# Patient Record
Sex: FEMALE | Race: WHITE | NOT HISPANIC OR LATINO | Employment: PART TIME | URBAN - METROPOLITAN AREA
[De-identification: names, ages, dates, MRNs, and addresses within clinical notes are randomized per-mention and may not be internally consistent; named-entity substitution may affect disease eponyms.]

---

## 2017-02-27 ENCOUNTER — OFFICE VISIT (OUTPATIENT)
Dept: FAMILY MEDICINE CLINIC | Facility: CLINIC | Age: 47
End: 2017-02-27

## 2017-02-27 VITALS
BODY MASS INDEX: 23.04 KG/M2 | WEIGHT: 130 LBS | HEIGHT: 63 IN | HEART RATE: 100 BPM | TEMPERATURE: 98.5 F | SYSTOLIC BLOOD PRESSURE: 102 MMHG | OXYGEN SATURATION: 99 % | DIASTOLIC BLOOD PRESSURE: 60 MMHG

## 2017-02-27 DIAGNOSIS — J06.9 VIRAL UPPER RESPIRATORY INFECTION: ICD-10-CM

## 2017-02-27 DIAGNOSIS — R68.89 FLU-LIKE SYMPTOMS: Primary | ICD-10-CM

## 2017-02-27 PROCEDURE — 99213 OFFICE O/P EST LOW 20 MIN: CPT | Performed by: FAMILY MEDICINE

## 2017-02-27 NOTE — PATIENT INSTRUCTIONS
Rest and increase fluids.  Treat symptoms OTC appropriately.  If symptoms worsen, call the office and ABX will be initiated.

## 2017-02-27 NOTE — PROGRESS NOTES
Subjective   Jill M Schoenbachler is a 46 y.o. female with   Chief Complaint   Patient presents with   • Chills     since Friday   • Generalized Body Aches   • Nasal Congestion   • Earache   • Sore Throat   .    History of Present Illness     Mary Jane, a 46 yr old white female, who presents today with new onset of chills, congestion, drainage, ear pain, sore throat and body aches for the last 3 days.  She thinks that she did have a fever although not documented.  Mary Jane thinks that she has a virus.  Her youngest has been ill recently.  She has continued to work on a daily basis.    The following portions of the patient's history were reviewed and updated as appropriate: allergies, current medications, past family history, past medical history, past social history, past surgical history and problem list.    Review of Systems   Constitutional: Positive for chills and fatigue.   HENT: Positive for congestion, ear pain, postnasal drip, sinus pressure, sore throat and voice change.    Respiratory: Positive for cough.    Neurological: Positive for weakness.   All other systems reviewed and are negative.      Objective     Vitals:    02/27/17 1642   BP: 102/60   Pulse: 100   Temp: 98.5 °F (36.9 °C)   SpO2: 99%     BP Readings from Last 3 Encounters:   02/27/17 102/60   08/24/16 126/82   03/29/16 116/64      Wt Readings from Last 3 Encounters:   02/27/17 130 lb (59 kg)   03/29/16 126 lb (57.2 kg)   01/06/16 124 lb 0.1 oz (56.3 kg)        Physical Exam   Constitutional: She is oriented to person, place, and time. She appears well-developed and well-nourished.   HENT:   Head: Normocephalic and atraumatic.   Right Ear: Hearing, tympanic membrane, external ear and ear canal normal.   Left Ear: Hearing, tympanic membrane, external ear and ear canal normal.   Nose: Nose normal.   Mouth/Throat: Uvula is midline, oropharynx is clear and moist and mucous membranes are normal.   Pulmonary/Chest: Effort normal and breath sounds normal.    Neurological: She is alert and oriented to person, place, and time.   Skin: Skin is warm.   Psychiatric: She has a normal mood and affect. Her behavior is normal. Judgment and thought content normal.   Nursing note and vitals reviewed.      Assessment/Plan   Mary Jane was seen today for chills, generalized body aches, nasal congestion, earache and sore throat.    Diagnoses and all orders for this visit:    Flu-like symptoms    Viral upper respiratory infection      Patient Instructions   Rest and increase fluids.  Treat symptoms OTC appropriately.  If symptoms worsen, call the office and ABX will be initiated.    Return if symptoms worsen or fail to improve.      Scribed for Sukhdev Coronado MD by Leon Collins. 02/27/2017    ISukhdev MD personally performed the services described in this documentation, as scribed by Leon Collins in my presence, and it is both accurate and complete

## 2017-03-22 ENCOUNTER — OFFICE VISIT (OUTPATIENT)
Dept: OBSTETRICS AND GYNECOLOGY | Age: 47
End: 2017-03-22

## 2017-03-22 VITALS
HEIGHT: 63 IN | BODY MASS INDEX: 22.15 KG/M2 | SYSTOLIC BLOOD PRESSURE: 110 MMHG | DIASTOLIC BLOOD PRESSURE: 64 MMHG | WEIGHT: 125 LBS

## 2017-03-22 DIAGNOSIS — N92.6 IRREGULAR MENSES: ICD-10-CM

## 2017-03-22 DIAGNOSIS — Z01.419 ENCOUNTER FOR GYNECOLOGICAL EXAMINATION: Primary | ICD-10-CM

## 2017-03-22 DIAGNOSIS — N95.1 MENOPAUSAL SYMPTOMS: ICD-10-CM

## 2017-03-22 LAB
25(OH)D3+25(OH)D2 SERPL-MCNC: 21.7 NG/ML (ref 30–100)
ALBUMIN SERPL-MCNC: 4.5 G/DL (ref 3.5–5.2)
ALBUMIN/GLOB SERPL: 1.7 G/DL
ALP SERPL-CCNC: 65 U/L (ref 39–117)
ALT SERPL-CCNC: 16 U/L (ref 1–33)
AST SERPL-CCNC: 16 U/L (ref 1–32)
BASOPHILS # BLD AUTO: 0.03 10*3/MM3 (ref 0–0.2)
BASOPHILS NFR BLD AUTO: 0.8 % (ref 0–1.5)
BILIRUB SERPL-MCNC: 0.3 MG/DL (ref 0.1–1.2)
BUN SERPL-MCNC: 16 MG/DL (ref 6–20)
BUN/CREAT SERPL: 26.7 (ref 7–25)
CALCIUM SERPL-MCNC: 9.7 MG/DL (ref 8.6–10.5)
CHLORIDE SERPL-SCNC: 102 MMOL/L (ref 98–107)
CHOLEST SERPL-MCNC: 230 MG/DL (ref 0–200)
CO2 SERPL-SCNC: 27.1 MMOL/L (ref 22–29)
CREAT SERPL-MCNC: 0.6 MG/DL (ref 0.57–1)
EOSINOPHIL # BLD AUTO: 0.09 10*3/MM3 (ref 0–0.7)
EOSINOPHIL NFR BLD AUTO: 2.5 % (ref 0.3–6.2)
ERYTHROCYTE [DISTWIDTH] IN BLOOD BY AUTOMATED COUNT: 14.4 % (ref 11.7–13)
GLOBULIN SER CALC-MCNC: 2.6 GM/DL
GLUCOSE SERPL-MCNC: 87 MG/DL (ref 65–99)
HCT VFR BLD AUTO: 39.8 % (ref 35.6–45.5)
HDLC SERPL-MCNC: 88 MG/DL (ref 40–60)
HGB BLD-MCNC: 13 G/DL (ref 11.9–15.5)
IMM GRANULOCYTES # BLD: 0 10*3/MM3 (ref 0–0.03)
IMM GRANULOCYTES NFR BLD: 0 % (ref 0–0.5)
LDLC SERPL CALC-MCNC: 135 MG/DL (ref 0–100)
LYMPHOCYTES # BLD AUTO: 1.05 10*3/MM3 (ref 0.9–4.8)
LYMPHOCYTES NFR BLD AUTO: 28.6 % (ref 19.6–45.3)
MCH RBC QN AUTO: 30.3 PG (ref 26.9–32)
MCHC RBC AUTO-ENTMCNC: 32.7 G/DL (ref 32.4–36.3)
MCV RBC AUTO: 92.8 FL (ref 80.5–98.2)
MONOCYTES # BLD AUTO: 0.33 10*3/MM3 (ref 0.2–1.2)
MONOCYTES NFR BLD AUTO: 9 % (ref 5–12)
NEUTROPHILS # BLD AUTO: 2.17 10*3/MM3 (ref 1.9–8.1)
NEUTROPHILS NFR BLD AUTO: 59.1 % (ref 42.7–76)
PLATELET # BLD AUTO: 296 10*3/MM3 (ref 140–500)
POTASSIUM SERPL-SCNC: 4.3 MMOL/L (ref 3.5–5.2)
PROT SERPL-MCNC: 7.1 G/DL (ref 6–8.5)
RBC # BLD AUTO: 4.29 10*6/MM3 (ref 3.9–5.2)
SODIUM SERPL-SCNC: 142 MMOL/L (ref 136–145)
TRIGL SERPL-MCNC: 36 MG/DL (ref 0–150)
TSH SERPL DL<=0.005 MIU/L-ACNC: 0.84 MIU/ML (ref 0.27–4.2)
VLDLC SERPL CALC-MCNC: 7.2 MG/DL (ref 5–40)
WBC # BLD AUTO: 3.67 10*3/MM3 (ref 4.5–10.7)

## 2017-03-22 PROCEDURE — 99396 PREV VISIT EST AGE 40-64: CPT | Performed by: OBSTETRICS & GYNECOLOGY

## 2017-03-22 NOTE — PROGRESS NOTES
"Subjective     Chief complaint annual exam      History of Present Illness      Jill M Schoenbachler is a very pleasant  46 y.o. female who presents for annual exam.  Mammo Exam , Contraception , Exercise patient is having an occasional irregular cycle and occasional hot flash.  Overall no other changes and remains relatively healthy.      Obstetric History:  OB History     No data available         Menstrual History:     Patient's last menstrual period was 2017.       Sexual History:       Past Medical History:   Diagnosis Date   • Allergic rhinitis    • Anxiety    • Anxiety, generalized    • Atypical chest pain    • GERD (gastroesophageal reflux disease)    • History of pregnancy      5, 2 MISCARRIAGES   • Hypercholesterolemia    • Hyperlipidemia    • Low back pain    • Lymphadenopathy    • Miscarriage     X2   • Panic attack    • Stress reaction      Past Surgical History:   Procedure Laterality Date   • NO PAST SURGERIES         SOCIAL Hx:      The following portions of the patient's history were reviewed and updated as appropriate: allergies, current medications, past family history, past medical history, past social history, past surgical history and problem list.    Review of Systems        Except as outlined in history of physical illness, patient denies any changes in her GYN, , GI systems.  All other systems reviewed were negative.         Objective   Physical Exam    /64  Ht 63\" (160 cm)  Wt 125 lb (56.7 kg)  LMP 2017  BMI 22.14 kg/m2    General: Patient is alert and oriented and appears overall healthy  Neck: Is supple without thyromegaly, no carotid bruits and no lymphadenopathy  Lungs: Clear bilaterally, no wheezing, rhonchi, or rales.  Respiratory rate is normal  Breast: Even, symmetrical, no lymphadenopathy, no retraction, no masses or cysts  Heart: Regular rate and rhythm are appreciated, no murmurs or rubs are heard  Abdomen: Is soft, without organomegaly, bowel " sounds are positive, there is no                                rebound or guarding and palpation does not produce any discomfort  Back: Nontender without CVA tenderness  Pelvic: External genitalia appear normal and consistent with mature female.  BUS normal                            Vagina is clean dry without discharge and appears adequately estrogenized, no               lesions or masses are present                         Cervix is noninflamed without discharge or lesions.  There is no cervical motion             tenderness.                Uterus is nonenlarged, without tenderness, and no masses or abnormalities are  present               Adnexa are non-enlarged, non tender               Rectal exam reveals adequate sphincter tone and no masses or lesions are                     appreciated on digital rectal examination.    There is no problem list on file for this patient.                Assessment/Plan   Mary Jane was seen today for gynecologic exam.    Diagnoses and all orders for this visit:    Encounter for gynecological examination  -     IGP, Apt HPV,rfx 16 / 18,45  -     CBC & Differential  -     Lipid Panel  -     TSH  -     Vitamin D 25 Hydroxy  -     Comprehensive Metabolic Panel    Menopausal symptoms  -     CBC & Differential  -     Lipid Panel  -     TSH  -     Vitamin D 25 Hydroxy  -     Comprehensive Metabolic Panel     We reviewed the etiology of irregular cycles and menopausal symptoms.  We will check some lab work and also discussed the possibility of trying to regulate her cycles but she does not want to do that at this stage.  Irregular menses  -     CBC & Differential  -     Lipid Panel  -     TSH  -     Vitamin D 25 Hydroxy  -     Comprehensive Metabolic Panel      Annual Well Woman Exam    Discussed today's findings and concerns with patient in detail.  Continue to recommend regular exercise to include cardiovascular and resistance training and breast self-exam. Wellness lab, mammography,  and pap smear, in accordance with age guidelines.  Nutritional and caloric recommendations discussed.    All of the patient's questions were addressed and answered, I have encouraged her to call for today's test results if she has not received them within 10 days.  Patient is advised to call with any change in her condition or with any other questions, otherwise return in 12 months for annual examination.

## 2017-03-22 NOTE — PROGRESS NOTES
"Subjective     Chief Complaint   Patient presents with   • Gynecologic Exam     Annual         History of Present Illness      Jill M Schoenbachler is a very pleasant  46 y.o. female who presents for annual exam.  Mammo Exam 2016, Contraception vas., Exercise none.      Obstetric History:  OB History     No data available         Menstrual History:     Patient's last menstrual period was 2017.       Sexual History:       Past Medical History:   Diagnosis Date   • Allergic rhinitis    • Anxiety    • Anxiety, generalized    • Atypical chest pain    • GERD (gastroesophageal reflux disease)    • History of pregnancy      5, 2 MISCARRIAGES   • Hypercholesterolemia    • Hyperlipidemia    • Low back pain    • Lymphadenopathy    • Miscarriage     X2   • Panic attack    • Stress reaction      Past Surgical History:   Procedure Laterality Date   • NO PAST SURGERIES         SOCIAL Hx:      The following portions of the patient's history were reviewed and updated as appropriate: allergies, current medications, past family history, past medical history, past social history, past surgical history and problem list.    Review of Systems        Except as outlined in history of physical illness, patient denies any changes in her GYN, , GI systems.  All other systems reviewed were negative.         Objective   Physical Exam    /64  Ht 63\" (160 cm)  Wt 125 lb (56.7 kg)  LMP 2017  BMI 22.14 kg/m2    General: Patient is alert and oriented and appears overall healthy  Neck: Is supple without thyromegaly, no carotid bruits and no lymphadenopathy  Lungs: Clear bilaterally, no wheezing, rhonchi, or rales.  Respiratory rate is normal  Breast: Even, symmetrical, no lymphadenopathy, no retraction, no masses or cysts  Heart: Regular rate and rhythm are appreciated, no murmurs or rubs are heard  Abdomen: Is soft, without organomegaly, bowel sounds are positive, there is no                                rebound or " guarding and palpation does not produce any discomfort  Back: Nontender without CVA tenderness  Pelvic: External genitalia appear normal and consistent with mature female.  BUS normal                            Vagina is clean dry without discharge and appears adequately estrogenized, no               lesions or masses are present                         Cervix is noninflamed without discharge or lesions.  There is no cervical motion             tenderness.                Uterus is nonenlarged, without tenderness, and no masses or abnormalities are  present               Adnexa are non-enlarged, non tender               Rectal exam reveals adequate sphincter tone and no masses or lesions are                     appreciated on digital rectal examination.    There is no problem list on file for this patient.                Assessment/Plan   Mary Jane was seen today for gynecologic exam.    Diagnoses and all orders for this visit:    Encounter for gynecological examination  -     IGP, Apt HPV,rfx 16 / 18,45        Annual Well Woman Exam    Discussed today's findings and concerns with patient in detail.  Continue to recommend regular exercise to include cardiovascular and resistance training and breast self-exam. Wellness lab, mammography, and pap smear, in accordance with age guidelines.  Nutritional and caloric recommendations discussed.    All of the patient's questions were addressed and answered, I have encouraged her to call for today's test results if she has not received them within 10 days.  Patient is advised to call with any change in her condition or with any other questions, otherwise return in 12 months for annual examination.

## 2017-03-23 ENCOUNTER — TELEPHONE (OUTPATIENT)
Dept: OBSTETRICS AND GYNECOLOGY | Age: 47
End: 2017-03-23

## 2017-03-23 NOTE — TELEPHONE ENCOUNTER
----- Message from Aravind Joaquin MD sent at 3/23/2017  9:53 AM EDT -----  Please notify patient her lab work is back.  She is not anemic   Her total cholesterol is slightly elevated but her good cholesterol is in a very good range so her ratio is okay.  She should bump up her vitamin D intake and take about 2000 units daily.  Otherwise labs are good

## 2017-03-23 NOTE — PROGRESS NOTES
Please notify patient her lab work is back.  She is not anemic   Her total cholesterol is slightly elevated but her good cholesterol is in a very good range so her ratio is okay.  She should bump up her vitamin D intake and take about 2000 units daily.  Otherwise labs are good

## 2017-03-27 ENCOUNTER — TELEPHONE (OUTPATIENT)
Dept: OBSTETRICS AND GYNECOLOGY | Age: 47
End: 2017-03-27

## 2017-03-27 LAB
CYTOLOGIST CVX/VAG CYTO: NORMAL
CYTOLOGY CVX/VAG DOC THIN PREP: NORMAL
DX ICD CODE: NORMAL
HIV 1 & 2 AB SER-IMP: NORMAL
HPV I/H RISK 4 DNA CVX QL PROBE+SIG AMP: NEGATIVE
Lab: NORMAL
OTHER STN SPEC: NORMAL
PATH REPORT.FINAL DX SPEC: NORMAL
STAT OF ADQ CVX/VAG CYTO-IMP: NORMAL

## 2017-12-26 ENCOUNTER — TELEPHONE (OUTPATIENT)
Dept: OBSTETRICS AND GYNECOLOGY | Age: 47
End: 2017-12-26

## 2017-12-26 NOTE — TELEPHONE ENCOUNTER
Dr TAYLOR pt exp hot flashes unable to tolerate and waking up 3-4x a night, does Dr TAYLOR recomm something otc or natural to begin with?

## 2018-01-11 ENCOUNTER — TELEPHONE (OUTPATIENT)
Dept: FAMILY MEDICINE CLINIC | Facility: CLINIC | Age: 48
End: 2018-01-11

## 2018-01-11 RX ORDER — OSELTAMIVIR PHOSPHATE 75 MG/1
75 CAPSULE ORAL 2 TIMES DAILY
Qty: 10 CAPSULE | Refills: 0 | Status: SHIPPED | OUTPATIENT
Start: 2018-01-11 | End: 2018-11-01

## 2018-03-28 ENCOUNTER — OFFICE VISIT (OUTPATIENT)
Dept: OBSTETRICS AND GYNECOLOGY | Age: 48
End: 2018-03-28

## 2018-03-28 VITALS
WEIGHT: 128 LBS | DIASTOLIC BLOOD PRESSURE: 68 MMHG | SYSTOLIC BLOOD PRESSURE: 110 MMHG | HEIGHT: 63 IN | BODY MASS INDEX: 22.68 KG/M2

## 2018-03-28 DIAGNOSIS — E55.9 VITAMIN D DEFICIENCY: ICD-10-CM

## 2018-03-28 DIAGNOSIS — Z01.419 ENCOUNTER FOR GYNECOLOGICAL EXAMINATION: Primary | ICD-10-CM

## 2018-03-28 PROCEDURE — 99396 PREV VISIT EST AGE 40-64: CPT | Performed by: OBSTETRICS & GYNECOLOGY

## 2018-03-28 NOTE — PROGRESS NOTES
"Subjective     Chief Complaint   Patient presents with   • Gynecologic Exam     Annual:last mammo 2016         History of Present Illness      Jill M Schoenbachler is a very pleasant  47 y.o. female who presents for annual exam.  Mammo Exam Rescheduled, Contraception vasectomy, Exercise none officially but patient gets 8-10,000 steps daily. She needs to add resistance to her exercise regimen.  She has no new gynecological concerns or complaints, the hot flashes that were so problematic previously seem to have resolved. We discussed the climacteric, premenopausal timeframe. She stopped taking her vitamin D and we need to recheck that today along with wellness labs..      Obstetric History:  OB History     No data available         Menstrual History:     Patient's last menstrual period was 2018 (approximate).       Sexual History:       Past Medical History:   Diagnosis Date   • Allergic rhinitis    • Anxiety    • Anxiety, generalized    • Atypical chest pain    • GERD (gastroesophageal reflux disease)    • History of pregnancy      5, 2 MISCARRIAGES   • Hypercholesterolemia    • Hyperlipidemia    • Low back pain    • Lymphadenopathy    • Miscarriage     X2   • Panic attack    • Stress reaction      Past Surgical History:   Procedure Laterality Date   • NO PAST SURGERIES         SOCIAL Hx:      The following portions of the patient's history were reviewed and updated as appropriate: allergies, current medications, past family history, past medical history, past social history, past surgical history and problem list.    Review of Systems        Except as outlined in history of physical illness, patient denies any changes in her GYN, , GI systems.  All other systems reviewed were negative.         Objective   Physical Exam    /68   Ht 160 cm (63\")   Wt 58.1 kg (128 lb)   LMP 2018 (Approximate)   BMI 22.67 kg/m²     General: Patient is alert and oriented and appears overall healthy  Neck: " Is supple without thyromegaly, no carotid bruits and no lymphadenopathy  Lungs: Clear bilaterally, no wheezing, rhonchi, or rales.  Respiratory rate is normal  Breast: Even, symmetrical, no lymphadenopathy, no retraction, no masses or cysts  Heart: Regular rate and rhythm are appreciated, no murmurs or rubs are heard  Abdomen: Is soft, without organomegaly, bowel sounds are positive, there is no                                rebound or guarding and palpation does not produce any discomfort  Back: Nontender without CVA tenderness  Pelvic: External genitalia appear normal and consistent with mature female.  BUS normal                            Vagina is clean dry without discharge and appears adequately estrogenized, no               lesions or masses are present                         Cervix is noninflamed without discharge or lesions.  There is no cervical motion             tenderness.                Uterus is nonenlarged, without tenderness, and no masses or abnormalities are  present               Adnexa are non-enlarged, non tender               Rectal exam reveals adequate sphincter tone and no masses or lesions are                     appreciated on digital rectal examination.    Patient Active Problem List   Diagnosis   • Vitamin D deficiency                 Assessment/Plan   Mary Jane was seen today for gynecologic exam.    Diagnoses and all orders for this visit:    Encounter for gynecological examination  -     IGP, Apt HPV,rfx 16 / 18,45 - ThinPrep Vial, Cervix  -     Comprehensive metabolic panel  -     Lipid panel  -     CBC and Differential  -     TSH  -     Vitamin D 25 Hydroxy    Vitamin D deficiency    If hot flashes recur patient will try this over-the-counter treatments, if she wants to try some hormone replacement therapy at that time we can do so but we have reviewed the risk and benefits and she is concerned.    Annual Well Woman Exam    Discussed today's findings and concerns with patient in  detail.  Continue to recommend regular exercise to include cardiovascular and resistance training and breast self-exam. Wellness lab, mammography, and pap smear, in accordance with age guidelines.  Nutritional  recommendations  were discussed.    All of the patient's questions were addressed and answered, I have encouraged her to call for today's test results if she has not received them within 10 days.  Patient is advised to call with any change in her condition or with any other questions, otherwise return in 12 months for annual examination.

## 2018-03-29 LAB
25(OH)D3+25(OH)D2 SERPL-MCNC: 35.2 NG/ML (ref 30–100)
ALBUMIN SERPL-MCNC: NORMAL G/DL
ALP SERPL-CCNC: NORMAL U/L
ALT SERPL-CCNC: NORMAL U/L
AST SERPL-CCNC: NORMAL U/L
BASOPHILS # BLD AUTO: 0.03 10*3/MM3 (ref 0–0.2)
BASOPHILS NFR BLD AUTO: 0.7 % (ref 0–1.5)
BILIRUB SERPL-MCNC: NORMAL MG/DL
BUN SERPL-MCNC: NORMAL MG/DL
CALCIUM SERPL-MCNC: NORMAL MG/DL
CHLORIDE SERPL-SCNC: NORMAL MMOL/L
CHOLEST SERPL-MCNC: 248 MG/DL (ref 0–200)
CO2 SERPL-SCNC: NORMAL MMOL/L
CREAT SERPL-MCNC: NORMAL MG/DL
EOSINOPHIL # BLD AUTO: 0.06 10*3/MM3 (ref 0–0.7)
EOSINOPHIL NFR BLD AUTO: 1.4 % (ref 0.3–6.2)
ERYTHROCYTE [DISTWIDTH] IN BLOOD BY AUTOMATED COUNT: 14.2 % (ref 11.7–13)
GLUCOSE SERPL-MCNC: NORMAL MG/DL
HCT VFR BLD AUTO: 43.9 % (ref 35.6–45.5)
HDLC SERPL-MCNC: 94 MG/DL (ref 40–60)
HGB BLD-MCNC: 13.9 G/DL (ref 11.9–15.5)
IMM GRANULOCYTES # BLD: 0 10*3/MM3 (ref 0–0.03)
IMM GRANULOCYTES NFR BLD: 0 % (ref 0–0.5)
LDLC SERPL CALC-MCNC: 145 MG/DL (ref 0–100)
LYMPHOCYTES # BLD AUTO: 1.38 10*3/MM3 (ref 0.9–4.8)
LYMPHOCYTES NFR BLD AUTO: 32.8 % (ref 19.6–45.3)
MCH RBC QN AUTO: 30.5 PG (ref 26.9–32)
MCHC RBC AUTO-ENTMCNC: 31.7 G/DL (ref 32.4–36.3)
MCV RBC AUTO: 96.5 FL (ref 80.5–98.2)
MONOCYTES # BLD AUTO: 0.3 10*3/MM3 (ref 0.2–1.2)
MONOCYTES NFR BLD AUTO: 7.1 % (ref 5–12)
NEUTROPHILS # BLD AUTO: 2.44 10*3/MM3 (ref 1.9–8.1)
NEUTROPHILS NFR BLD AUTO: 58 % (ref 42.7–76)
PLATELET # BLD AUTO: 262 10*3/MM3 (ref 140–500)
POTASSIUM SERPL-SCNC: NORMAL MMOL/L
PROT SERPL-MCNC: NORMAL G/DL
RBC # BLD AUTO: 4.55 10*6/MM3 (ref 3.9–5.2)
REQUEST PROBLEM: NORMAL
SODIUM SERPL-SCNC: NORMAL MMOL/L
TRIGL SERPL-MCNC: 46 MG/DL (ref 0–150)
TSH SERPL DL<=0.005 MIU/L-ACNC: 0.79 MIU/ML (ref 0.27–4.2)
VLDLC SERPL CALC-MCNC: 9.2 MG/DL (ref 5–40)
WBC # BLD AUTO: 4.21 10*3/MM3 (ref 4.5–10.7)

## 2018-03-30 ENCOUNTER — TELEPHONE (OUTPATIENT)
Dept: OBSTETRICS AND GYNECOLOGY | Age: 48
End: 2018-03-30

## 2018-03-30 NOTE — TELEPHONE ENCOUNTER
----- Message from Aravind Joaquin MD sent at 3/29/2018  2:06 PM EDT -----  Please call the patient regarding her abnormal result. Total cholesterol is slightly elevated but her good cholesterol is very elevated so the ratio is okay. Vitamin D is normal.

## 2018-11-01 ENCOUNTER — OFFICE VISIT (OUTPATIENT)
Dept: FAMILY MEDICINE CLINIC | Facility: CLINIC | Age: 48
End: 2018-11-01

## 2018-11-01 VITALS
DIASTOLIC BLOOD PRESSURE: 68 MMHG | WEIGHT: 131 LBS | HEIGHT: 63 IN | TEMPERATURE: 98.3 F | BODY MASS INDEX: 23.21 KG/M2 | HEART RATE: 110 BPM | OXYGEN SATURATION: 99 % | SYSTOLIC BLOOD PRESSURE: 120 MMHG | RESPIRATION RATE: 16 BRPM

## 2018-11-01 DIAGNOSIS — Z23 IMMUNIZATION DUE: ICD-10-CM

## 2018-11-01 DIAGNOSIS — R53.83 FATIGUE, UNSPECIFIED TYPE: ICD-10-CM

## 2018-11-01 DIAGNOSIS — H69.83 DYSFUNCTION OF BOTH EUSTACHIAN TUBES: Primary | ICD-10-CM

## 2018-11-01 PROCEDURE — 90715 TDAP VACCINE 7 YRS/> IM: CPT | Performed by: NURSE PRACTITIONER

## 2018-11-01 PROCEDURE — 99213 OFFICE O/P EST LOW 20 MIN: CPT | Performed by: NURSE PRACTITIONER

## 2018-11-01 PROCEDURE — 90471 IMMUNIZATION ADMIN: CPT | Performed by: NURSE PRACTITIONER

## 2018-11-01 RX ORDER — FLUTICASONE PROPIONATE 50 MCG
2 SPRAY, SUSPENSION (ML) NASAL DAILY
COMMUNITY
Start: 2018-11-01 | End: 2019-01-21

## 2018-11-01 RX ORDER — MELATONIN
1000 DAILY
COMMUNITY
End: 2022-01-14

## 2018-11-01 NOTE — PATIENT INSTRUCTIONS
Eustachian Tube Dysfunction  The eustachian tube connects the middle ear to the back of the nose. It regulates air pressure in the middle ear by allowing air to move between the ear and nose. It also helps to drain fluid from the middle ear space. When the eustachian tube does not function properly, air pressure, fluid, or both can build up in the middle ear.  Eustachian tube dysfunction can affect one or both ears.  What are the causes?  This condition happens when the eustachian tube becomes blocked or cannot open normally. This may result from:  · Ear infections.  · Colds and other upper respiratory infections.  · Allergies.  · Irritation, such as from cigarette smoke or acid from the stomach coming up into the esophagus (gastroesophageal reflux).  · Sudden changes in air pressure, such as from descending in an airplane.  · Abnormal growths in the nose or throat, such as nasal polyps, tumors, or enlarged tissue at the back of the throat (adenoids).    What increases the risk?  This condition may be more likely to develop in people who smoke and people who are overweight. Eustachian tube dysfunction may also be more likely to develop in children, especially children who have:  · Certain birth defects of the mouth, such as cleft palate.  · Large tonsils and adenoids.    What are the signs or symptoms?  Symptoms of this condition may include:  · A feeling of fullness in the ear.  · Ear pain.  · Clicking or popping noises in the ear.  · Ringing in the ear.  · Hearing loss.  · Loss of balance.    Symptoms may get worse when the air pressure around you changes, such as when you travel to an area of high elevation or fly on an airplane.  How is this diagnosed?  This condition may be diagnosed based on:  · Your symptoms.  · A physical exam of your ear, nose, and throat.  · Tests, such as those that measure:  ? The movement of your eardrum (tympanogram).  ? Your hearing (audiometry).    How is this treated?  Treatment  "depends on the cause and severity of your condition. If your symptoms are mild, you may be able to relieve your symptoms by moving air into (\"popping\") your ears. If you have symptoms of fluid in your ears, treatment may include:  · Decongestants.  · Antihistamines.  · Nasal sprays or ear drops that contain medicines that reduce swelling (steroids).    In some cases, you may need to have a procedure to drain the fluid in your eardrum (myringotomy). In this procedure, a small tube is placed in the eardrum to:  · Drain the fluid.  · Restore the air in the middle ear space.    Follow these instructions at home:  · Take over-the-counter and prescription medicines only as told by your health care provider.  · Use techniques to help pop your ears as recommended by your health care provider. These may include:  ? Chewing gum.  ? Yawning.  ? Frequent, forceful swallowing.  ? Closing your mouth, holding your nose closed, and gently blowing as if you are trying to blow air out of your nose.  · Do not do any of the following until your health care provider approves:  ? Travel to high altitudes.  ? Fly in airplanes.  ? Work in a pressurized cabin or room.  ? Scuba dive.  · Keep your ears dry. Dry your ears completely after showering or bathing.  · Do not smoke.  · Keep all follow-up visits as told by your health care provider. This is important.  Contact a health care provider if:  · Your symptoms do not go away after treatment.  · Your symptoms come back after treatment.  · You are unable to pop your ears.  · You have:  ? A fever.  ? Pain in your ear.  ? Pain in your head or neck.  ? Fluid draining from your ear.  · Your hearing suddenly changes.  · You become very dizzy.  · You lose your balance.  This information is not intended to replace advice given to you by your health care provider. Make sure you discuss any questions you have with your health care provider.  Document Released: 01/13/2017 Document Revised: 05/25/2017 " Document Reviewed: 01/06/2016  [employer] Patient Education © 2018 [employer]

## 2018-11-01 NOTE — PROGRESS NOTES
Patient ID: Jill M Schoenbachler is a 48 y.o. female     Subjective     Chief Complaint   Patient presents with   • Jaw Pain   • Hot Flashes   • Fatigue       History of Present Illness    Jill M Schoenbachler presents to the office today for complaints of jaw pain which started on Monday and has gradually improved.  She has been seen about 2 weeks ago for same complaints and was felt related to allergies.  She was prescribed Stahist and Flonase which she only took for a few days.   She also has hot flashes which she feels is related to menopause.  She has had chronic fatigue.  Has been around family few weeks ago who had mono.      She denies any complaints of fever, chills, cough, chest pain, shortness of air, abdominal pain, nausea, or any other concerns.     The following portions of the patient's history were reviewed and updated as appropriate: allergies, current medications, past family history, past medical history, past social history, past surgical history and problem list.       Review of Systems   Constitution: Positive for malaise/fatigue and night sweats. Negative for fever.   HENT: Negative.         Jaw pain started on Monday   Eyes: Negative.    Cardiovascular: Negative.  Negative for chest pain.   Respiratory: Negative.  Negative for shortness of breath.    Endocrine: Negative.    Hematologic/Lymphatic: Negative.    Skin: Negative.    Musculoskeletal: Negative.    Gastrointestinal: Negative.    Genitourinary: Negative.    Neurological: Negative.    Psychiatric/Behavioral: Negative.        Vitals:    11/01/18 1504   BP: 120/68   Pulse: 110   Resp: 16   Temp: 98.3 °F (36.8 °C)   SpO2: 99%       Documented weights    11/01/18 1504   Weight: 59.4 kg (131 lb)       Results for orders placed or performed in visit on 03/28/18   IGP, Apt HPV,rfx 16 / 18,45 - ThinPrep Vial, Cervix   Result Value Ref Range    Diagnosis Comment     Specimen adequacy: Comment     Clinician Provided ICD-10: Comment     Performed  by: Comment     QC reviewed by: Comment     . .     Note: Comment     Method: Comment     HPV Aptima Negative Negative   Comprehensive metabolic panel   Result Value Ref Range    Glucose CANCELED mg/dL    BUN CANCELED     Creatinine CANCELED     Sodium CANCELED     Potassium CANCELED     Chloride CANCELED     Total CO2 CANCELED     Calcium CANCELED     Total Protein CANCELED     Albumin CANCELED     Total Bilirubin CANCELED     Alkaline Phosphatase CANCELED     AST (SGOT) CANCELED     ALT (SGPT) CANCELED    Lipid panel   Result Value Ref Range    Total Cholesterol 248 (H) 0 - 200 mg/dL    Triglycerides 46 0 - 150 mg/dL    HDL Cholesterol 94 (H) 40 - 60 mg/dL    VLDL Cholesterol 9.2 5 - 40 mg/dL    LDL Cholesterol  145 (H) 0 - 100 mg/dL   TSH   Result Value Ref Range    TSH 0.785 0.270 - 4.200 mIU/mL   Vitamin D 25 Hydroxy   Result Value Ref Range    25 Hydroxy, Vitamin D 35.2 30.0 - 100.0 ng/mL   Request Problem   Result Value Ref Range    Request Problem CANCELED    CBC and Differential   Result Value Ref Range    WBC 4.21 (L) 4.50 - 10.70 10*3/mm3    RBC 4.55 3.90 - 5.20 10*6/mm3    Hemoglobin 13.9 11.9 - 15.5 g/dL    Hematocrit 43.9 35.6 - 45.5 %    MCV 96.5 80.5 - 98.2 fL    MCH 30.5 26.9 - 32.0 pg    MCHC 31.7 (L) 32.4 - 36.3 g/dL    RDW 14.2 (H) 11.7 - 13.0 %    Platelets 262 140 - 500 10*3/mm3    Neutrophil Rel % 58.0 42.7 - 76.0 %    Lymphocyte Rel % 32.8 19.6 - 45.3 %    Monocyte Rel % 7.1 5.0 - 12.0 %    Eosinophil Rel % 1.4 0.3 - 6.2 %    Basophil Rel % 0.7 0.0 - 1.5 %    Neutrophils Absolute 2.44 1.90 - 8.10 10*3/mm3    Lymphocytes Absolute 1.38 0.90 - 4.80 10*3/mm3    Monocytes Absolute 0.30 0.20 - 1.20 10*3/mm3    Eosinophils Absolute 0.06 0.00 - 0.70 10*3/mm3    Basophils Absolute 0.03 0.00 - 0.20 10*3/mm3    Immature Granulocyte Rel % 0.0 0.0 - 0.5 %    Immature Grans Absolute 0.00 0.00 - 0.03 10*3/mm3       Objective     Physical Exam   Constitutional: She is oriented to person, place, and time. She  appears well-developed and well-nourished. No distress.   HENT:   Head: Normocephalic and atraumatic.   Right Ear: Tympanic membrane is bulging.   Left Ear: Tympanic membrane is bulging.   Mouth/Throat: Oropharynx is clear and moist and mucous membranes are normal. No oropharyngeal exudate.   Cardiovascular: Normal rate and regular rhythm.    Pulmonary/Chest: Effort normal and breath sounds normal. No respiratory distress.   Musculoskeletal: She exhibits no edema.   Lymphadenopathy:     She has no cervical adenopathy.   Neurological: She is alert and oriented to person, place, and time.   Skin: Skin is warm and dry. No rash noted.   Psychiatric: She has a normal mood and affect.          Assessment/Plan     Assessment/Plan   Mary Jane was seen today for jaw pain, hot flashes and fatigue.    Diagnoses and all orders for this visit:    Dysfunction of both eustachian tubes  -     fluticasone (FLONASE) 50 MCG/ACT nasal spray; 2 sprays into the nostril(s) as directed by provider Daily.    Fatigue, unspecified type  -     Hayden-Barr Virus VCA Antibody Panel  -     CBC & Differential  -     Comprehensive Metabolic Panel  -     TSH  -     Vitamin B12    Immunization due  -     Tdap Vaccine Greater Than or Equal To 8yo IM        Summary:  Jill M Schoenbachler appears to have eustachian tube dysfunction which could cause the jaw pain.  Instructed to use Flonase as directed.  Stahist as needed as previously prescribed.  Exam otherwise normal.  She wants blood work checked due to chronic fatigue which will be completed today and will call with results.  Also request Tdap vaccine.      In the meantime, instructed to contact us sooner for any problems or concerns.    Josephine Correa, APRN  Family Medicine  [facility]  11/01/18  5:20 PM

## 2018-11-02 LAB
ALBUMIN SERPL-MCNC: 4.6 G/DL (ref 3.5–5.2)
ALBUMIN/GLOB SERPL: 1.6 G/DL
ALP SERPL-CCNC: 77 U/L (ref 40–129)
ALT SERPL-CCNC: 13 U/L (ref 5–33)
AST SERPL-CCNC: 17 U/L (ref 5–32)
BASOPHILS # BLD AUTO: 0.04 10*3/MM3 (ref 0–0.2)
BASOPHILS NFR BLD AUTO: 0.7 % (ref 0–2)
BILIRUB SERPL-MCNC: 0.2 MG/DL (ref 0.2–1.2)
BUN SERPL-MCNC: 18 MG/DL (ref 6–20)
BUN/CREAT SERPL: 26.5 (ref 7–25)
CALCIUM SERPL-MCNC: 9.2 MG/DL (ref 8.6–10.5)
CHLORIDE SERPL-SCNC: 102 MMOL/L (ref 98–107)
CO2 SERPL-SCNC: 29.4 MMOL/L (ref 22–29)
CREAT SERPL-MCNC: 0.68 MG/DL (ref 0.57–1)
EBV EA IGG SER-ACNC: <9 U/ML (ref 0–8.9)
EBV NA IGG SER IA-ACNC: >600 U/ML (ref 0–17.9)
EBV VCA IGG SER IA-ACNC: >600 U/ML (ref 0–17.9)
EBV VCA IGM SER IA-ACNC: <36 U/ML (ref 0–35.9)
EOSINOPHIL # BLD AUTO: 0.11 10*3/MM3 (ref 0.1–0.3)
EOSINOPHIL NFR BLD AUTO: 2.1 % (ref 0–4)
ERYTHROCYTE [DISTWIDTH] IN BLOOD BY AUTOMATED COUNT: 13.6 % (ref 11.5–14.5)
GLOBULIN SER CALC-MCNC: 2.8 GM/DL
GLUCOSE SERPL-MCNC: 123 MG/DL (ref 65–99)
HCT VFR BLD AUTO: 37.9 % (ref 37–47)
HGB BLD-MCNC: 12 G/DL (ref 12–16)
IMM GRANULOCYTES # BLD: 0.01 10*3/MM3 (ref 0–0.03)
IMM GRANULOCYTES NFR BLD: 0.2 % (ref 0–0.5)
LYMPHOCYTES # BLD AUTO: 1.19 10*3/MM3 (ref 0.6–4.8)
LYMPHOCYTES NFR BLD AUTO: 22.2 % (ref 20–45)
MCH RBC QN AUTO: 28.5 PG (ref 27–31)
MCHC RBC AUTO-ENTMCNC: 31.7 G/DL (ref 31–37)
MCV RBC AUTO: 90 FL (ref 81–99)
MONOCYTES # BLD AUTO: 0.45 10*3/MM3 (ref 0–1)
MONOCYTES NFR BLD AUTO: 8.4 % (ref 3–8)
NEUTROPHILS # BLD AUTO: 3.55 10*3/MM3 (ref 1.5–8.3)
NEUTROPHILS NFR BLD AUTO: 66.4 % (ref 45–70)
NRBC BLD AUTO-RTO: 0 /100 WBC (ref 0–0)
PLATELET # BLD AUTO: 268 10*3/MM3 (ref 140–500)
POTASSIUM SERPL-SCNC: 3.9 MMOL/L (ref 3.5–5.2)
PROT SERPL-MCNC: 7.4 G/DL (ref 6–8.5)
RBC # BLD AUTO: 4.21 10*6/MM3 (ref 4.2–5.4)
SERVICE CMNT-IMP: ABNORMAL
SODIUM SERPL-SCNC: 142 MMOL/L (ref 136–145)
TSH SERPL DL<=0.005 MIU/L-ACNC: 1.07 MIU/ML (ref 0.27–4.2)
VIT B12 SERPL-MCNC: 702 PG/ML
WBC # BLD AUTO: 5.35 10*3/MM3 (ref 4.8–10.8)

## 2018-11-05 NOTE — PROGRESS NOTES
Notify Jill M Schoenbachler her lab results are stable concerning her fatigue.  Her vitamin B12 and vitamin D is in normal range.  No active Hayden Barr virus at this time.

## 2019-01-21 ENCOUNTER — OFFICE VISIT (OUTPATIENT)
Dept: FAMILY MEDICINE CLINIC | Facility: CLINIC | Age: 49
End: 2019-01-21

## 2019-01-21 VITALS
HEART RATE: 98 BPM | DIASTOLIC BLOOD PRESSURE: 72 MMHG | WEIGHT: 128 LBS | BODY MASS INDEX: 22.68 KG/M2 | SYSTOLIC BLOOD PRESSURE: 108 MMHG | RESPIRATION RATE: 16 BRPM | OXYGEN SATURATION: 98 % | HEIGHT: 63 IN | TEMPERATURE: 98.2 F

## 2019-01-21 DIAGNOSIS — D17.1 LIPOMA OF TORSO: Primary | ICD-10-CM

## 2019-01-21 PROCEDURE — 99213 OFFICE O/P EST LOW 20 MIN: CPT | Performed by: NURSE PRACTITIONER

## 2019-01-21 NOTE — PROGRESS NOTES
Patient ID: Jill M Schoenbachler is a 48 y.o. female     Subjective     Chief Complaint   Patient presents with   • Cyst     LEFT RIB AREA       History of Present Illness    Jill M Schoenbachler presents to the office today for nodule to left rib area.  First noticed 1-2 weeks ago.  Non-painful.  No redness or drainage.  No known injury.  Happened to notice area when looking in mirror.     She denies any complaints of fever, chills, cough, chest pain, shortness of air, abdominal pain, nausea, or any other concerns.     The following portions of the patient's history were reviewed and updated as appropriate: allergies, current medications, past family history, past medical history, past social history, past surgical history and problem list.       Review of Systems   Constitution: Negative.   HENT: Negative.    Eyes: Negative.    Cardiovascular: Negative.    Respiratory: Negative.    Endocrine: Negative.    Hematologic/Lymphatic: Negative.    Skin: Positive for suspicious lesions.   Musculoskeletal: Negative.    Gastrointestinal: Negative.    Genitourinary: Negative.    Neurological: Negative.    Psychiatric/Behavioral: Negative.        Vitals:    01/21/19 1346   BP: 108/72   Pulse: 98   Resp: 16   Temp: 98.2 °F (36.8 °C)   SpO2: 98%       Documented weights    01/21/19 1346   Weight: 58.1 kg (128 lb)       Results for orders placed or performed in visit on 11/01/18   Hayden-Barr Virus VCA Antibody Panel   Result Value Ref Range    EBV VCA IgM <36.0 0.0 - 35.9 U/mL    EBV Early Antigen Ab, IgG <9.0 0.0 - 8.9 U/mL    EBV VCA IgG >600.0 (H) 0.0 - 17.9 U/mL    EBV Nuclear Antigen Ab, IgG >600.0 (H) 0.0 - 17.9 U/mL    Interpretation Comment    Comprehensive Metabolic Panel   Result Value Ref Range    Glucose 123 (H) 65 - 99 mg/dL    BUN 18 6 - 20 mg/dL    Creatinine 0.68 0.57 - 1.00 mg/dL    eGFR Non African Am 92 >60 mL/min/1.73    eGFR African Am 111 >60 mL/min/1.73    BUN/Creatinine Ratio 26.5 (H) 7.0 - 25.0     Sodium 142 136 - 145 mmol/L    Potassium 3.9 3.5 - 5.2 mmol/L    Chloride 102 98 - 107 mmol/L    Total CO2 29.4 (H) 22.0 - 29.0 mmol/L    Calcium 9.2 8.6 - 10.5 mg/dL    Total Protein 7.4 6.0 - 8.5 g/dL    Albumin 4.60 3.50 - 5.20 g/dL    Globulin 2.8 gm/dL    A/G Ratio 1.6 g/dL    Total Bilirubin 0.2 0.2 - 1.2 mg/dL    Alkaline Phosphatase 77 40 - 129 U/L    AST (SGOT) 17 5 - 32 U/L    ALT (SGPT) 13 5 - 33 U/L   TSH   Result Value Ref Range    TSH 1.070 0.270 - 4.200 mIU/mL   Vitamin B12   Result Value Ref Range    Vitamin B-12 702 232-1,245 pg/mL   CBC & Differential   Result Value Ref Range    WBC 5.35 4.80 - 10.80 10*3/mm3    RBC 4.21 4.20 - 5.40 10*6/mm3    Hemoglobin 12.0 12.0 - 16.0 g/dL    Hematocrit 37.9 37.0 - 47.0 %    MCV 90.0 81.0 - 99.0 fL    MCH 28.5 27.0 - 31.0 pg    MCHC 31.7 31.0 - 37.0 g/dL    RDW 13.6 11.5 - 14.5 %    Platelets 268 140 - 500 10*3/mm3    Neutrophil Rel % 66.4 45.0 - 70.0 %    Lymphocyte Rel % 22.2 20.0 - 45.0 %    Monocyte Rel % 8.4 (H) 3.0 - 8.0 %    Eosinophil Rel % 2.1 0.0 - 4.0 %    Basophil Rel % 0.7 0.0 - 2.0 %    Neutrophils Absolute 3.55 1.50 - 8.30 10*3/mm3    Lymphocytes Absolute 1.19 0.60 - 4.80 10*3/mm3    Monocytes Absolute 0.45 0.00 - 1.00 10*3/mm3    Eosinophils Absolute 0.11 0.10 - 0.30 10*3/mm3    Basophils Absolute 0.04 0.00 - 0.20 10*3/mm3    Immature Granulocyte Rel % 0.2 0.0 - 0.5 %    Immature Grans Absolute 0.01 0.00 - 0.03 10*3/mm3    nRBC 0.0 0.0 - 0.0 /100 WBC       Objective     Physical Exam   Constitutional: She appears well-developed and well-nourished.   Cardiovascular: Normal rate.   Pulmonary/Chest: Effort normal.   Skin: Skin is warm and dry. No rash noted. No erythema.   3 cm soft nodule, movable, non-tender          Assessment/Plan     Assessment/Plan   Mary Jane was seen today for cyst.    Diagnoses and all orders for this visit:    Lipoma of torso   Education given on diagnosis.  No treatment needed.  Benign nodule. Verbalized  understanding.   Notify us if area changes character or any concerns.         Josephine Correa, APRN  Family Medicine  [facility] [place]  01/21/19  1:51 PM

## 2019-01-21 NOTE — PATIENT INSTRUCTIONS
Lipoma  A lipoma is a noncancerous (benign) tumor that is made up of fat cells. This is a very common type of soft-tissue growth. Lipomas are usually found under the skin (subcutaneous). They may occur in any tissue of the body that contains fat. Common areas for lipomas to appear include the back, shoulders, buttocks, and thighs. Lipomas grow slowly, and they are usually painless. Most lipomas do not cause problems and do not require treatment.  What are the causes?  The cause of this condition is not known.  What increases the risk?  This condition is more likely to develop in:  · People who are 40-60 years old.  · People who have a family history of lipomas.    What are the signs or symptoms?  A lipoma usually appears as a small, round bump under the skin. It may feel soft or rubbery, but the firmness can vary. Most lipomas are not painful. However, a lipoma may become painful if it is located in an area where it pushes on nerves.  How is this diagnosed?  A lipoma can usually be diagnosed with a physical exam. You may also have tests to confirm the diagnosis and to rule out other conditions. Tests may include:  · Imaging tests, such as a CT scan or MRI.  · Removal of a tissue sample to be looked at under a microscope (biopsy).    How is this treated?  Treatment is not needed for small lipomas that are not causing problems. If a lipoma continues to get bigger or it causes problems, removal is often the best option. Lipomas can also be removed to improve appearance. Removal of a lipoma is usually done with a surgery in which the fatty cells and the surrounding capsule are removed. Most often, a medicine that numbs the area (local anesthetic) is used for this procedure.  Follow these instructions at home:  · Keep all follow-up visits as directed by your health care provider. This is important.  Contact a health care provider if:  · Your lipoma becomes larger or hard.  · Your lipoma becomes painful, red, or  increasingly swollen. These could be signs of infection or a more serious condition.  This information is not intended to replace advice given to you by your health care provider. Make sure you discuss any questions you have with your health care provider.  Document Released: 12/08/2003 Document Revised: 05/25/2017 Document Reviewed: 12/14/2015  [employer] Interactive Patient Education © 2018 [employer]

## 2019-01-30 ENCOUNTER — OFFICE VISIT (OUTPATIENT)
Dept: FAMILY MEDICINE CLINIC | Facility: CLINIC | Age: 49
End: 2019-01-30

## 2019-01-30 VITALS
HEIGHT: 63 IN | HEART RATE: 98 BPM | BODY MASS INDEX: 22.32 KG/M2 | DIASTOLIC BLOOD PRESSURE: 78 MMHG | RESPIRATION RATE: 16 BRPM | SYSTOLIC BLOOD PRESSURE: 122 MMHG | WEIGHT: 126 LBS | OXYGEN SATURATION: 97 % | TEMPERATURE: 98.5 F

## 2019-01-30 DIAGNOSIS — J02.9 ACUTE PHARYNGITIS, UNSPECIFIED ETIOLOGY: Primary | ICD-10-CM

## 2019-01-30 DIAGNOSIS — R59.1 LYMPHADENOPATHY: ICD-10-CM

## 2019-01-30 LAB
EXPIRATION DATE: NORMAL
INTERNAL CONTROL: NORMAL
Lab: NORMAL
S PYO AG THROAT QL: NEGATIVE

## 2019-01-30 PROCEDURE — 99213 OFFICE O/P EST LOW 20 MIN: CPT | Performed by: PHYSICIAN ASSISTANT

## 2019-01-30 PROCEDURE — 87880 STREP A ASSAY W/OPTIC: CPT | Performed by: PHYSICIAN ASSISTANT

## 2019-01-30 NOTE — PROGRESS NOTES
Subjective   Jill M Schoenbachler is a 48 y.o. female presents for   Chief Complaint   Patient presents with   • Cough   • Swollen Glands   • Pain     ear       History of Present Illness     Mary Jane is a 48 year old female who presents with right ear pain,swollen glands,dry cough,post nasal drip, and chest tightness  for the past 2 days ago.  She was seen at urgent care  2-3 weeks ago at [facility].    She was checked for strep 2-3 weeks ago.   She was treated for right ear infection with Amoxil.  She took all of the medication.  Denied any fevers,chills,headaches,sinus pressure,shortness of air,wheezing,nausea,vomiting or diarrhea.  Appetite has been normal.  Sleep has been normal.  Mary Jane has been taking OTC robitussin and Aleve for th last day. Her son was sick after [place] with a viral infection.      The following portions of the patient's history were reviewed and updated as appropriate: allergies, current medications, past family history, past medical history, past social history, past surgical history and problem list.    Review of Systems   Constitutional: Negative.    HENT: Positive for ear pain, postnasal drip and sore throat.    Eyes: Negative.    Respiratory: Positive for cough and chest tightness. Negative for shortness of breath and wheezing.    Cardiovascular: Negative.  Negative for chest pain, palpitations and leg swelling.   Gastrointestinal: Negative.    Endocrine: Negative.    Genitourinary: Negative.    Musculoskeletal: Negative.    Skin: Negative.    Allergic/Immunologic: Negative.    Neurological: Negative.  Negative for dizziness, light-headedness and headaches.   Hematological: Positive for adenopathy.   Psychiatric/Behavioral: Negative.    All other systems reviewed and are negative.        Vitals:    01/30/19 1511   BP: 122/78   BP Location: Left arm   Patient Position: Sitting   Cuff Size: Adult   Pulse: 98   Resp: 16   Temp: 98.5 °F (36.9 °C)   TempSrc: Oral   SpO2: 97%   Weight: 57.2 kg  "(126 lb)   Height: 160 cm (63\")     Wt Readings from Last 3 Encounters:   01/30/19 57.2 kg (126 lb)   01/21/19 58.1 kg (128 lb)   11/01/18 59.4 kg (131 lb)       BP Readings from Last 3 Encounters:   01/30/19 122/78   01/21/19 108/72   11/01/18 120/68     Social History     Socioeconomic History   • Marital status:      Spouse name: Not on file   • Number of children: Not on file   • Years of education: Not on file   • Highest education level: Not on file   Social Needs   • Financial resource strain: Not on file   • Food insecurity - worry: Not on file   • Food insecurity - inability: Not on file   • Transportation needs - medical: Not on file   • Transportation needs - non-medical: Not on file   Occupational History   • Not on file   Tobacco Use   • Smoking status: Never Smoker   • Smokeless tobacco: Never Used   Substance and Sexual Activity   • Alcohol use: No   • Drug use: No   • Sexual activity: Not on file   Other Topics Concern   • Not on file   Social History Narrative   • Not on file       Allergies   Allergen Reactions   • Cefdinir        Body mass index is 22.32 kg/m².    Objective   Physical Exam   Constitutional: She is oriented to person, place, and time. Vital signs are normal. She appears well-developed and well-nourished.   HENT:   Head: Normocephalic and atraumatic.   Right Ear: Hearing, tympanic membrane, external ear and ear canal normal.   Left Ear: Hearing, tympanic membrane, external ear and ear canal normal.   Nose: Nose normal. Right sinus exhibits no maxillary sinus tenderness and no frontal sinus tenderness. Left sinus exhibits no maxillary sinus tenderness and no frontal sinus tenderness.   Mouth/Throat: Uvula is midline and mucous membranes are normal. Posterior oropharyngeal erythema present. No oropharyngeal exudate.   Eyes: Conjunctivae, EOM and lids are normal. Pupils are equal, round, and reactive to light.   Neck: Trachea normal and phonation normal. Neck supple. No edema " present.   Cardiovascular: Normal rate, regular rhythm, S1 normal, S2 normal, normal heart sounds and normal pulses.   Pulmonary/Chest: Effort normal and breath sounds normal.   Abdominal: Soft. Normal appearance, normal aorta and bowel sounds are normal. There is no hepatomegaly. There is no tenderness.   Lymphadenopathy:     She has cervical adenopathy (1+).        Right cervical: Posterior cervical adenopathy present.        Left cervical: Posterior cervical adenopathy present.   Neurological: She is alert and oriented to person, place, and time.   Skin: Skin is warm, dry and intact. Capillary refill takes less than 2 seconds.   Psychiatric: She has a normal mood and affect. Her speech is normal and behavior is normal. Judgment and thought content normal. Cognition and memory are normal.     Results for orders placed or performed in visit on 01/30/19   POC Rapid Strep A   Result Value Ref Range    Rapid Strep A Screen Negative Negative, VALID, INVALID, Not Performed    Internal Control Passed Passed    Lot Number [id number]     Expiration Date 5,938,020        Assessment/Plan   Mary Jane was seen today for cough, swollen glands and pain.    Diagnoses and all orders for this visit:    Acute pharyngitis, unspecified etiology  -     POC Rapid Strep A  -     Hayden-Barr Virus VCA Antibody Panel  -     CBC & Differential    Lymphadenopathy  -     Hayden-Barr Virus VCA Antibody Panel  -     CBC & Differential    Ms. Jill Schoenbachler was seen in office today with new onset acute pharyngitis and lymphadenopathy.  In office strep screen was negative.  She will have a CBC and Hayden-Barr virus collected at office visit today for further evaluation of possible mononucleosis.  She'll be notified of test results when completed.      Lou Banks PA-C    [facility]  [address] [address]  Dept: [phone]  Dept Fax: [phone]  Loc:  [phone]  Loc Fax: [phone]

## 2019-01-31 LAB
BASOPHILS # BLD AUTO: 0.05 10*3/MM3 (ref 0–0.2)
BASOPHILS NFR BLD AUTO: 0.7 % (ref 0–2)
EBV EA IGG SER-ACNC: <9 U/ML (ref 0–8.9)
EBV NA IGG SER IA-ACNC: >600 U/ML (ref 0–17.9)
EBV VCA IGG SER IA-ACNC: >600 U/ML (ref 0–17.9)
EBV VCA IGM SER IA-ACNC: <36 U/ML (ref 0–35.9)
EOSINOPHIL # BLD AUTO: 0.09 10*3/MM3 (ref 0.1–0.3)
EOSINOPHIL NFR BLD AUTO: 1.2 % (ref 0–4)
ERYTHROCYTE [DISTWIDTH] IN BLOOD BY AUTOMATED COUNT: 13.4 % (ref 11.5–14.5)
HCT VFR BLD AUTO: 43 % (ref 37–47)
HGB BLD-MCNC: 13.9 G/DL (ref 12–16)
IMM GRANULOCYTES # BLD AUTO: 0.02 10*3/MM3 (ref 0–0.03)
IMM GRANULOCYTES NFR BLD AUTO: 0.3 % (ref 0–0.5)
LYMPHOCYTES # BLD AUTO: 1.88 10*3/MM3 (ref 0.6–4.8)
LYMPHOCYTES NFR BLD AUTO: 24.8 % (ref 20–45)
MCH RBC QN AUTO: 29.1 PG (ref 27–31)
MCHC RBC AUTO-ENTMCNC: 32.3 G/DL (ref 31–37)
MCV RBC AUTO: 90 FL (ref 81–99)
MONOCYTES # BLD AUTO: 0.41 10*3/MM3 (ref 0–1)
MONOCYTES NFR BLD AUTO: 5.4 % (ref 3–8)
NEUTROPHILS # BLD AUTO: 5.13 10*3/MM3 (ref 1.5–8.3)
NEUTROPHILS NFR BLD AUTO: 67.6 % (ref 45–70)
NRBC BLD AUTO-RTO: 0 /100 WBC (ref 0–0)
PLATELET # BLD AUTO: 302 10*3/MM3 (ref 140–500)
RBC # BLD AUTO: 4.78 10*6/MM3 (ref 4.2–5.4)
SERVICE CMNT-IMP: ABNORMAL
WBC # BLD AUTO: 7.58 10*3/MM3 (ref 4.8–10.8)

## 2019-02-11 ENCOUNTER — OFFICE VISIT (OUTPATIENT)
Dept: FAMILY MEDICINE CLINIC | Facility: CLINIC | Age: 49
End: 2019-02-11

## 2019-02-11 VITALS
HEIGHT: 63 IN | TEMPERATURE: 98.4 F | RESPIRATION RATE: 16 BRPM | HEART RATE: 85 BPM | BODY MASS INDEX: 23.39 KG/M2 | DIASTOLIC BLOOD PRESSURE: 64 MMHG | SYSTOLIC BLOOD PRESSURE: 120 MMHG | WEIGHT: 132 LBS | OXYGEN SATURATION: 98 %

## 2019-02-11 DIAGNOSIS — J06.9 ACUTE URI: Primary | ICD-10-CM

## 2019-02-11 PROCEDURE — 99213 OFFICE O/P EST LOW 20 MIN: CPT | Performed by: PHYSICIAN ASSISTANT

## 2019-02-11 RX ORDER — METHYLPREDNISOLONE 4 MG/1
TABLET ORAL
Qty: 21 TABLET | Refills: 0 | Status: SHIPPED | OUTPATIENT
Start: 2019-02-11 | End: 2019-04-02

## 2019-02-11 RX ORDER — AZITHROMYCIN 250 MG/1
TABLET, FILM COATED ORAL
Qty: 6 TABLET | Refills: 0 | Status: SHIPPED | OUTPATIENT
Start: 2019-02-11 | End: 2019-04-02

## 2019-02-11 NOTE — PROGRESS NOTES
Subjective   Jill M Schoenbachler is a 48 y.o. female presents for   Chief Complaint   Patient presents with   • Swollen Glands   • Cough       History of Present Illness     Mary Jane is a 48-year-old female who presents for continual swelling glands and cough for the past month.  She was seen at urgent care approximately 3 weeks ago and was treated for ear infection with amoxicillin.  I saw her in office on January 30, 2019 was swollen glands, continue ear pain, dry cough and chest tightness.  She was diagnosed with a viral infection.  She had lab work for Hayden-Joya which was negative with normal CBC on January 30,2019.  She had a strep test in office today which was also negative.  Mary Jane states that she still has swollen glands,ear pressure and is having a dry cough.  Mary Jane states that she doesn't feel bad.  Denied any fevers,chills,headaches,sore throat,wheezing,shortness of air nausea,vomiting or diarrhea.  She has taken OTC Mucinex.  Appetite and sleep have been normal.      The following portions of the patient's history were reviewed and updated as appropriate: allergies, current medications, past family history, past medical history, past social history, past surgical history and problem list.    Review of Systems   Constitutional: Negative.  Negative for chills, fatigue and fever.   HENT: Positive for congestion, ear pain and postnasal drip. Negative for rhinorrhea, sinus pressure, sinus pain, sneezing and sore throat.    Eyes: Negative.    Respiratory: Positive for cough. Negative for shortness of breath and wheezing.    Cardiovascular: Negative.  Negative for chest pain, palpitations and leg swelling.   Gastrointestinal: Negative.  Negative for diarrhea, nausea and vomiting.   Endocrine: Negative.    Genitourinary: Negative.    Musculoskeletal: Negative.  Negative for myalgias.   Skin: Negative.    Allergic/Immunologic: Negative.    Neurological: Negative.  Negative for headaches.   Hematological: Positive  "for adenopathy.   Psychiatric/Behavioral: Negative.  Negative for sleep disturbance.   All other systems reviewed and are negative.        Vitals:    02/11/19 1317   BP: 120/64   BP Location: Left arm   Patient Position: Sitting   Cuff Size: Adult   Pulse: 85   Resp: 16   Temp: 98.4 °F (36.9 °C)   TempSrc: Oral   SpO2: 98%   Weight: 59.9 kg (132 lb)   Height: 160 cm (63\")     BP Readings from Last 3 Encounters:   02/11/19 120/64   01/30/19 122/78   01/21/19 108/72       Wt Readings from Last 3 Encounters:   02/11/19 59.9 kg (132 lb)   01/30/19 57.2 kg (126 lb)   01/21/19 58.1 kg (128 lb)     Social History     Socioeconomic History   • Marital status:      Spouse name: Not on file   • Number of children: Not on file   • Years of education: Not on file   • Highest education level: Not on file   Social Needs   • Financial resource strain: Not on file   • Food insecurity - worry: Not on file   • Food insecurity - inability: Not on file   • Transportation needs - medical: Not on file   • Transportation needs - non-medical: Not on file   Occupational History   • Not on file   Tobacco Use   • Smoking status: Never Smoker   • Smokeless tobacco: Never Used   Substance and Sexual Activity   • Alcohol use: No   • Drug use: No   • Sexual activity: Not on file   Other Topics Concern   • Not on file   Social History Narrative   • Not on file       Allergies   Allergen Reactions   • Cefdinir Other (See Comments)     unknown       Body mass index is 23.38 kg/m².    Objective   Physical Exam   Constitutional: She is oriented to person, place, and time. Vital signs are normal. She appears well-developed and well-nourished.   HENT:   Head: Normocephalic and atraumatic.   Right Ear: Hearing, tympanic membrane, external ear and ear canal normal.   Left Ear: Hearing, external ear and ear canal normal. Tympanic membrane is bulging.   Nose: Nose normal. Right sinus exhibits no maxillary sinus tenderness and no frontal sinus " tenderness. Left sinus exhibits no maxillary sinus tenderness and no frontal sinus tenderness.   Mouth/Throat: Uvula is midline and mucous membranes are normal.   clear drainage   Eyes: Conjunctivae, EOM and lids are normal. Pupils are equal, round, and reactive to light.   Neck: Trachea normal and phonation normal. Neck supple. No edema present.   Cardiovascular: Normal rate, regular rhythm, S1 normal, S2 normal, normal heart sounds and normal pulses.   Pulmonary/Chest: Effort normal and breath sounds normal.   Abdominal: Soft. Normal appearance, normal aorta and bowel sounds are normal. There is no hepatomegaly. There is no tenderness.   Lymphadenopathy:     She has cervical adenopathy.        Left cervical: Superficial cervical (shotty) adenopathy present.   Neurological: She is alert and oriented to person, place, and time.   Skin: Skin is warm, dry and intact. Capillary refill takes less than 2 seconds.   Psychiatric: She has a normal mood and affect. Her speech is normal and behavior is normal. Judgment and thought content normal. Cognition and memory are normal.       Assessment/Plan   Mary Jane was seen today for swollen glands and cough.    Diagnoses and all orders for this visit:    Acute URI  -     MethylPREDNISolone (MEDROL, EMIGDIO,) 4 MG tablet; Take as directed on package instructions.  -     azithromycin (ZITHROMAX Z-EMIGDIO) 250 MG tablet; Take 2 tablets the first day, then 1 tablet daily for 4 days.      Mrs. Jill Schoenbachler was seen in office today was diagnosed acute upper respiratory infection.  She has been having symptoms for almost a month now.  I'll place on Medrol Dosepak and a Z-Emigdio.  She'll will continue using her over-the-counter Nyquil at night and Mucinex during the daytime.  She was encouraged to increase fluids as well.  She will return to office if symptoms do not improve.      Office Visit on 01/30/2019   Component Date Value Ref Range Status   • Rapid Strep A Screen 01/30/2019 Negative   Negative, VALID, INVALID, Not Performed Final   • Internal Control 01/30/2019 Passed  Passed Final   • Lot Number 01/30/2019 [id number]   Final   • Expiration Date 01/30/2019 [id number]   Final   • EBV VCA IgM 01/30/2019 <36.0  0.0 - 35.9 U/mL Final    Comment:                                  Negative        <36.0                                   Equivocal 36.0 - 43.9                                   Positive        >43.9     • EBV Early Antigen Ab, IgG 01/30/2019 <9.0  0.0 - 8.9 U/mL Final    Comment:                                  Negative        < 9.0                                   Equivocal  9.0 - 10.9                                   Positive        >10.9     • EBV VCA IgG 01/30/2019 >600.0* 0.0 - 17.9 U/mL Final    Comment:                                  Negative        <18.0                                   Equivocal 18.0 - 21.9                                   Positive        >21.9     • EBV Nuclear Antigen Ab, IgG 01/30/2019 >600.0* 0.0 - 17.9 U/mL Final    Comment:                                  Negative        <18.0                                   Equivocal 18.0 - 21.9                                   Positive        >21.9     • Interpretation 01/30/2019 Comment   Final    Comment:                EBV Interpretation Chart  Interpretation   EBV-IgM  EA(D)-IgG  VCA-IgG  EBNA-IgG  EBV Seronegative    -        -         -          -  Early Phase         +        -         -          -  Acute Primary       +       +or-       +          -  Infection  Convalescence/Past  -       +or-       +          +  Infection  Reactivated        +or-      +         +          +  Infection         + Antibody Present      - Antibody Absent     • WBC 01/30/2019 7.58  4.80 - 10.80 10*3/mm3 Final   • RBC 01/30/2019 4.78  4.20 - 5.40 10*6/mm3 Final   • Hemoglobin 01/30/2019 13.9  12.0 - 16.0 g/dL Final   • Hematocrit 01/30/2019 43.0  37.0 - 47.0 % Final   • MCV 01/30/2019 90.0  81.0 - 99.0 fL Final   • MCH  01/30/2019 29.1  27.0 - 31.0 pg Final   • MCHC 01/30/2019 32.3  31.0 - 37.0 g/dL Final   • RDW 01/30/2019 13.4  11.5 - 14.5 % Final   • Platelets 01/30/2019 302  140 - 500 10*3/mm3 Final   • Neutrophil Rel % 01/30/2019 67.6  45.0 - 70.0 % Final   • Lymphocyte Rel % 01/30/2019 24.8  20.0 - 45.0 % Final   • Monocyte Rel % 01/30/2019 5.4  3.0 - 8.0 % Final   • Eosinophil Rel % 01/30/2019 1.2  0.0 - 4.0 % Final   • Basophil Rel % 01/30/2019 0.7  0.0 - 2.0 % Final   • Neutrophils Absolute 01/30/2019 5.13  1.50 - 8.30 10*3/mm3 Final   • Lymphocytes Absolute 01/30/2019 1.88  0.60 - 4.80 10*3/mm3 Final   • Monocytes Absolute 01/30/2019 0.41  0.00 - 1.00 10*3/mm3 Final   • Eosinophils Absolute 01/30/2019 0.09* 0.10 - 0.30 10*3/mm3 Final   • Basophils Absolute 01/30/2019 0.05  0.00 - 0.20 10*3/mm3 Final   • Immature Granulocyte Rel % 01/30/2019 0.3  0.0 - 0.5 % Final   • Immature Grans Absolute 01/30/2019 0.02  0.00 - 0.03 10*3/mm3 Final   • nRBC 01/30/2019 0.0  0.0 - 0.0 /100 WBC Final       Lou Banks PA-C    [facility]  [address] [address]  Dept: [phone]  Dept Fax: [phone]  Loc: [phone]  Loc Fax: [phone]

## 2019-04-02 ENCOUNTER — OFFICE VISIT (OUTPATIENT)
Dept: FAMILY MEDICINE CLINIC | Facility: CLINIC | Age: 49
End: 2019-04-02

## 2019-04-02 ENCOUNTER — HOSPITAL ENCOUNTER (OUTPATIENT)
Dept: GENERAL RADIOLOGY | Facility: HOSPITAL | Age: 49
Discharge: HOME | End: 2019-04-02
Admitting: FAMILY MEDICINE

## 2019-04-02 VITALS
HEIGHT: 63 IN | HEART RATE: 83 BPM | OXYGEN SATURATION: 99 % | DIASTOLIC BLOOD PRESSURE: 64 MMHG | BODY MASS INDEX: 23.39 KG/M2 | SYSTOLIC BLOOD PRESSURE: 126 MMHG | WEIGHT: 132 LBS

## 2019-04-02 DIAGNOSIS — R73.02 IMPAIRED GLUCOSE TOLERANCE: ICD-10-CM

## 2019-04-02 DIAGNOSIS — E55.9 VITAMIN D DEFICIENCY: ICD-10-CM

## 2019-04-02 DIAGNOSIS — Z82.49 FAMILY HISTORY OF ABDOMINAL AORTIC ANEURYSM (AAA): ICD-10-CM

## 2019-04-02 DIAGNOSIS — E78.2 MIXED HYPERLIPIDEMIA: ICD-10-CM

## 2019-04-02 DIAGNOSIS — R30.0 DYSURIA: Primary | ICD-10-CM

## 2019-04-02 DIAGNOSIS — G89.29 CHRONIC BILATERAL LOW BACK PAIN WITHOUT SCIATICA: ICD-10-CM

## 2019-04-02 DIAGNOSIS — M54.50 CHRONIC BILATERAL LOW BACK PAIN WITHOUT SCIATICA: ICD-10-CM

## 2019-04-02 PROBLEM — E78.5 HYPERLIPIDEMIA: Status: ACTIVE | Noted: 2019-04-02

## 2019-04-02 LAB
BILIRUB BLD-MCNC: NEGATIVE MG/DL
CLARITY, POC: CLEAR
COLOR UR: YELLOW
GLUCOSE UR STRIP-MCNC: NEGATIVE MG/DL
KETONES UR QL: ABNORMAL
LEUKOCYTE EST, POC: NEGATIVE
NITRITE UR-MCNC: NEGATIVE MG/ML
PH UR: 5 [PH] (ref 5–8)
PROT UR STRIP-MCNC: NEGATIVE MG/DL
RBC # UR STRIP: NEGATIVE /UL
SP GR UR: 1.03 (ref 1–1.03)
UROBILINOGEN UR QL: NORMAL

## 2019-04-02 PROCEDURE — 81002 URINALYSIS NONAUTO W/O SCOPE: CPT | Performed by: FAMILY MEDICINE

## 2019-04-02 PROCEDURE — 72110 X-RAY EXAM L-2 SPINE 4/>VWS: CPT

## 2019-04-02 PROCEDURE — 99214 OFFICE O/P EST MOD 30 MIN: CPT | Performed by: FAMILY MEDICINE

## 2019-04-02 NOTE — PROGRESS NOTES
Subjective   Jill M Schoenbachler is a 48 y.o. female with   Chief Complaint   Patient presents with   • Back Pain   • FHx Premature Cardiovascular Disease     Father with Aortic aneurism   • Urinary Tract Infection     Follow Up from 1 month ago at Urgent care   .    History of Present Illness     47 yo white female presents today to discuss several issues.  Pt was seen 1 month ago at Urgent Care for a UTI.  She states she is having the same symptoms again.  She states she is having an odd sensation when she voids.  She denies dysuria, frequency and urgency, she states it almost feels as though she is straining.  She is currently perimenopausal so she is unsure if this symptom is related to menopause.  She see's Dr Joaquin for all GYN needs and she is currently up to date.  Her father passed from an AAA.  At that time she and her siblings were told to be evaluated for that around the age of 50.  She states he was a smoker and did not have cardiovascular disease.  Pt has not had fasting lab work in just over a year.  Pt states she has had low back pain where it hurts into her hips.  She has had xray's in the past but she doesn't remember exactly when.  She states she has had the pain for many years.  She rarely takes any OTC medications for it, she is trying to do exercise or rest when she can.      The following portions of the patient's history were reviewed and updated as appropriate: allergies, current medications, past family history, past medical history, past social history, past surgical history and problem list.    Review of Systems   Cardiovascular: Negative for chest pain, palpitations and leg swelling.        HLD   Endocrine: Negative for cold intolerance and heat intolerance.        Glucose Intolerance  Vit D Def   Genitourinary: Negative for dysuria, frequency and urgency.        Straining with urination   Musculoskeletal: Positive for back pain.   All other systems reviewed and are  negative.      Objective     Vitals:    04/02/19 1443   BP: 126/64   Pulse: 83   SpO2: 99%     BP Readings from Last 3 Encounters:   04/02/19 126/64   02/11/19 120/64   01/30/19 122/78      Wt Readings from Last 3 Encounters:   04/02/19 59.9 kg (132 lb)   02/11/19 59.9 kg (132 lb)   01/30/19 57.2 kg (126 lb)        Recent Results (from the past 168 hour(s))   POCT urinalysis dipstick, manual    Collection Time: 04/02/19  2:53 PM   Result Value Ref Range    Color Yellow Yellow, Straw, Dark Yellow, Asiya    Clarity, UA Clear Clear    Glucose, UA Negative Negative, 1000 mg/dL (3+) mg/dL    Bilirubin Negative Negative    Ketones, UA 2+ (A) Negative    Specific Gravity  1.030 1.005 - 1.030    Blood, UA Negative Negative    pH, Urine 5.0 5.0 - 8.0    Protein, POC Negative Negative mg/dL    Urobilinogen, UA Normal Normal    Leukocytes Negative Negative    Nitrite, UA Negative Negative       Physical Exam   Constitutional: She is oriented to person, place, and time. Vital signs are normal. She appears well-developed and well-nourished. She is cooperative.   HENT:   Head: Normocephalic and atraumatic.   Right Ear: Hearing, tympanic membrane, external ear and ear canal normal.   Left Ear: Hearing, tympanic membrane, external ear and ear canal normal.   Nose: Nose normal.   Mouth/Throat: Uvula is midline, oropharynx is clear and moist and mucous membranes are normal.   Neck: Trachea normal and phonation normal. Neck supple. Normal carotid pulses present. Carotid bruit is not present. No thyroid mass and no thyromegaly present.   Cardiovascular: Normal rate, regular rhythm and normal heart sounds. Exam reveals no gallop and no friction rub.   No murmur heard.  Pulmonary/Chest: Effort normal and breath sounds normal. No respiratory distress. She has no decreased breath sounds. She has no wheezes. She has no rhonchi. She has no rales.   Abdominal: Soft. Normal appearance and bowel sounds are normal. She exhibits no distension and  no mass. There is no hepatosplenomegaly. There is no tenderness. There is no rigidity, no rebound, no guarding and no CVA tenderness. No hernia.   Musculoskeletal:   LS spine with full range of motion.  Increased tenderness in the lower lumbar areas.  No evidence of SI tenderness or sciatic notch tenderness.   Lymphadenopathy:     She has no cervical adenopathy.   Neurological: She is alert and oriented to person, place, and time. She has normal strength and normal reflexes. No sensory deficit. Gait normal.   Skin: Skin is warm and dry. No rash noted.   Psychiatric: She has a normal mood and affect. Her speech is normal and behavior is normal. Judgment and thought content normal. Cognition and memory are normal.   Nursing note and vitals reviewed.      Assessment/Plan   Mary Jane was seen today for back pain, fhx premature cardiovascular disease and urinary tract infection.    Diagnoses and all orders for this visit:    Dysuria  -     POCT urinalysis dipstick, manual  -     Urine Culture - Urine, Urine, Clean Catch    Mixed hyperlipidemia  -     CBC & Differential; Future  -     Comprehensive Metabolic Panel; Future  -     Lipid Panel; Future  -     TSH; Future    Vitamin D deficiency  -     TSH; Future  -     Vitamin D 25 Hydroxy; Future    Impaired glucose tolerance  -     Hemoglobin A1c; Future  -     TSH; Future    Family history of abdominal aortic aneurysm (AAA)  -     US AAA Screen Limited    Chronic bilateral low back pain without sciatica  -     XR Spine Lumbar 4+ View      Patient Instructions   Make an appointment to return to for fasting lab work.      Return if symptoms worsen or fail to improve.    Scribed for Sukhdev Coronado MD by Luisa Still CMA. 04/02/2019    Sukhdev SILVER MD personally performed the services described in this documentation, as scribed by Luisa Still CMA in my presence, and it is both accurate and complete

## 2019-04-03 PROBLEM — M54.50 CHRONIC BILATERAL LOW BACK PAIN WITHOUT SCIATICA: Status: ACTIVE | Noted: 2019-04-03

## 2019-04-03 PROBLEM — G89.29 CHRONIC BILATERAL LOW BACK PAIN WITHOUT SCIATICA: Status: ACTIVE | Noted: 2019-04-03

## 2019-04-03 PROBLEM — Z82.49 FAMILY HISTORY OF ABDOMINAL AORTIC ANEURYSM (AAA): Status: ACTIVE | Noted: 2019-04-03

## 2019-04-03 PROBLEM — R73.02 IMPAIRED GLUCOSE TOLERANCE: Status: ACTIVE | Noted: 2019-04-03

## 2019-04-05 LAB
BACTERIA UR CULT: ABNORMAL
BACTERIA UR CULT: ABNORMAL

## 2019-04-09 ENCOUNTER — OFFICE VISIT (OUTPATIENT)
Dept: FAMILY MEDICINE CLINIC | Facility: CLINIC | Age: 49
End: 2019-04-09

## 2019-04-09 VITALS
BODY MASS INDEX: 23.04 KG/M2 | SYSTOLIC BLOOD PRESSURE: 110 MMHG | OXYGEN SATURATION: 99 % | TEMPERATURE: 98.3 F | HEART RATE: 80 BPM | WEIGHT: 130 LBS | DIASTOLIC BLOOD PRESSURE: 74 MMHG | HEIGHT: 63 IN | RESPIRATION RATE: 16 BRPM

## 2019-04-09 DIAGNOSIS — E55.9 VITAMIN D DEFICIENCY: ICD-10-CM

## 2019-04-09 DIAGNOSIS — R73.02 IMPAIRED GLUCOSE TOLERANCE: ICD-10-CM

## 2019-04-09 DIAGNOSIS — L20.9 ATOPIC DERMATITIS, UNSPECIFIED TYPE: Primary | ICD-10-CM

## 2019-04-09 DIAGNOSIS — E78.2 MIXED HYPERLIPIDEMIA: ICD-10-CM

## 2019-04-09 PROCEDURE — 99213 OFFICE O/P EST LOW 20 MIN: CPT | Performed by: FAMILY MEDICINE

## 2019-04-09 NOTE — PROGRESS NOTES
Subjective   Jill M Schoenbachler is a 48 y.o. female with   Chief Complaint   Patient presents with   • Possible ring worm     right arm   .    History of Present Illness     49 yo white female presents today with possible ringworm.  The area has between there for the last 2 weeks and it is not pruritic.  She states it has improved, and it actually appears to be going away.  Pt is otherwise well and without other acute complaints.  No other skin lesions have been present.    The following portions of the patient's history were reviewed and updated as appropriate: allergies, current medications, past family history, past medical history, past social history, past surgical history and problem list.    Review of Systems   Skin: Positive for color change.        Right forearm.   All other systems reviewed and are negative.      Objective     Vitals:    04/09/19 1338   BP: 110/74   Pulse: 80   Resp: 16   Temp: 98.3 °F (36.8 °C)   SpO2: 99%     BP Readings from Last 3 Encounters:   04/09/19 110/74   04/02/19 126/64   02/11/19 120/64      Wt Readings from Last 3 Encounters:   04/09/19 59 kg (130 lb)   04/02/19 59.9 kg (132 lb)   02/11/19 59.9 kg (132 lb)        Physical Exam   Constitutional: She is oriented to person, place, and time. Vital signs are normal. She appears well-developed and well-nourished. She is cooperative.   HENT:   Head: Normocephalic and atraumatic.   Neurological: She is alert and oriented to person, place, and time.   Skin: Skin is warm and dry. Rash noted.   2 erythematous patches in the right forearm area that are completely flat and somewhat dry in its superficial feel.  There is a well-circumscribed and there has been no excoriation.   Psychiatric: She has a normal mood and affect. Her speech is normal and behavior is normal. Judgment and thought content normal. Cognition and memory are normal.   Nursing note and vitals reviewed.      Assessment/Plan   Mary Jane was seen today for possible ring  worm.    Diagnoses and all orders for this visit:    Atopic dermatitis, unspecified type        Return if symptoms worsen or fail to improve.

## 2019-04-10 LAB
25(OH)D3+25(OH)D2 SERPL-MCNC: 43.3 NG/ML (ref 30–100)
ALBUMIN SERPL-MCNC: 4 G/DL (ref 3.5–5.2)
ALBUMIN/GLOB SERPL: 1.4 G/DL
ALP SERPL-CCNC: 60 U/L (ref 39–117)
ALT SERPL-CCNC: 14 U/L (ref 1–33)
AST SERPL-CCNC: 16 U/L (ref 1–32)
BASOPHILS # BLD AUTO: 0.03 10*3/MM3 (ref 0–0.2)
BASOPHILS NFR BLD AUTO: 0.5 % (ref 0–1.5)
BILIRUB SERPL-MCNC: 0.4 MG/DL (ref 0.2–1.2)
BUN SERPL-MCNC: 12 MG/DL (ref 6–20)
BUN/CREAT SERPL: 23.5 (ref 7–25)
CALCIUM SERPL-MCNC: 9.5 MG/DL (ref 8.6–10.5)
CHLORIDE SERPL-SCNC: 100 MMOL/L (ref 98–107)
CHOLEST SERPL-MCNC: 207 MG/DL (ref 0–200)
CO2 SERPL-SCNC: 25.5 MMOL/L (ref 22–29)
CREAT SERPL-MCNC: 0.51 MG/DL (ref 0.57–1)
EOSINOPHIL # BLD AUTO: 0.05 10*3/MM3 (ref 0–0.4)
EOSINOPHIL NFR BLD AUTO: 0.8 % (ref 0.3–6.2)
ERYTHROCYTE [DISTWIDTH] IN BLOOD BY AUTOMATED COUNT: 13.9 % (ref 12.3–15.4)
GLOBULIN SER CALC-MCNC: 2.9 GM/DL
GLUCOSE SERPL-MCNC: 77 MG/DL (ref 65–99)
HBA1C MFR BLD: 5.4 % (ref 4.8–5.6)
HCT VFR BLD AUTO: 37.8 % (ref 34–46.6)
HDLC SERPL-MCNC: 85 MG/DL (ref 40–60)
HGB BLD-MCNC: 12 G/DL (ref 12–15.9)
IMM GRANULOCYTES # BLD AUTO: 0.02 10*3/MM3 (ref 0–0.05)
IMM GRANULOCYTES NFR BLD AUTO: 0.3 % (ref 0–0.5)
LDLC SERPL CALC-MCNC: 112 MG/DL (ref 0–100)
LYMPHOCYTES # BLD AUTO: 1.54 10*3/MM3 (ref 0.7–3.1)
LYMPHOCYTES NFR BLD AUTO: 24.4 % (ref 19.6–45.3)
MCH RBC QN AUTO: 30 PG (ref 26.6–33)
MCHC RBC AUTO-ENTMCNC: 31.7 G/DL (ref 31.5–35.7)
MCV RBC AUTO: 94.5 FL (ref 79–97)
MONOCYTES # BLD AUTO: 0.52 10*3/MM3 (ref 0.1–0.9)
MONOCYTES NFR BLD AUTO: 8.2 % (ref 5–12)
NEUTROPHILS # BLD AUTO: 4.16 10*3/MM3 (ref 1.4–7)
NEUTROPHILS NFR BLD AUTO: 65.8 % (ref 42.7–76)
NRBC BLD AUTO-RTO: 0 /100 WBC (ref 0–0)
PLATELET # BLD AUTO: 259 10*3/MM3 (ref 140–450)
POTASSIUM SERPL-SCNC: 4.5 MMOL/L (ref 3.5–5.2)
PROT SERPL-MCNC: 6.9 G/DL (ref 6–8.5)
RBC # BLD AUTO: 4 10*6/MM3 (ref 3.77–5.28)
SODIUM SERPL-SCNC: 139 MMOL/L (ref 136–145)
TRIGL SERPL-MCNC: 48 MG/DL (ref 0–150)
TSH SERPL DL<=0.005 MIU/L-ACNC: 1.51 MIU/ML (ref 0.27–4.2)
VLDLC SERPL CALC-MCNC: 9.6 MG/DL (ref 5–40)
WBC # BLD AUTO: 6.32 10*3/MM3 (ref 3.4–10.8)

## 2019-04-23 ENCOUNTER — TELEPHONE (OUTPATIENT)
Dept: OBSTETRICS AND GYNECOLOGY | Age: 49
End: 2019-04-23

## 2019-04-23 RX ORDER — CLOTRIMAZOLE AND BETAMETHASONE DIPROPIONATE 10; .64 MG/G; MG/G
CREAM TOPICAL 2 TIMES DAILY
Qty: 45 G | Refills: 1 | Status: SHIPPED | OUTPATIENT
Start: 2019-04-23 | End: 2020-02-06

## 2019-04-23 NOTE — TELEPHONE ENCOUNTER
"Dr Joaquin pt states she is currently in menopause \"she guess\" reports have a red labia that burned & itched over night that woke her from her sleep. Pt states she hasn't has a period in the last six months but did have two the last two months. Isn't sure if its yeast yet she reports no  discharge or just needs a cream pt states. Please advise.   "

## 2019-05-01 ENCOUNTER — HOSPITAL ENCOUNTER (OUTPATIENT)
Dept: ULTRASOUND IMAGING | Facility: HOSPITAL | Age: 49
Discharge: HOME | End: 2019-05-01
Admitting: FAMILY MEDICINE

## 2019-05-01 PROCEDURE — 76706 US ABDL AORTA SCREEN AAA: CPT

## 2019-05-08 ENCOUNTER — TELEPHONE (OUTPATIENT)
Dept: OBSTETRICS AND GYNECOLOGY | Age: 49
End: 2019-05-08

## 2019-05-08 NOTE — TELEPHONE ENCOUNTER
Pt.will use the cream and call us back if no better. She refrained from using it because she started her period.

## 2019-05-08 NOTE — TELEPHONE ENCOUNTER
Dr Joaquin pt stated she did not take the topical cream because it is for external use and her sx's are internal. Pt is still having the same sx's and would like for Addis to give her a call if that is ok. #[phone] Please Advise

## 2019-06-04 ENCOUNTER — APPOINTMENT (OUTPATIENT)
Dept: WOMENS IMAGING | Facility: HOSPITAL | Age: 49
End: 2019-06-04

## 2019-06-04 ENCOUNTER — PROCEDURE VISIT (OUTPATIENT)
Dept: OBSTETRICS AND GYNECOLOGY | Age: 49
End: 2019-06-04

## 2019-06-04 ENCOUNTER — OFFICE VISIT (OUTPATIENT)
Dept: OBSTETRICS AND GYNECOLOGY | Age: 49
End: 2019-06-04

## 2019-06-04 VITALS
DIASTOLIC BLOOD PRESSURE: 68 MMHG | HEIGHT: 63 IN | SYSTOLIC BLOOD PRESSURE: 110 MMHG | WEIGHT: 127 LBS | BODY MASS INDEX: 22.5 KG/M2

## 2019-06-04 DIAGNOSIS — N95.1 PERIMENOPAUSAL: ICD-10-CM

## 2019-06-04 DIAGNOSIS — Z12.31 VISIT FOR SCREENING MAMMOGRAM: Primary | ICD-10-CM

## 2019-06-04 DIAGNOSIS — Z01.419 ENCOUNTER FOR GYNECOLOGICAL EXAMINATION: Primary | ICD-10-CM

## 2019-06-04 PROCEDURE — 77067 SCR MAMMO BI INCL CAD: CPT | Performed by: RADIOLOGY

## 2019-06-04 PROCEDURE — 77067 SCR MAMMO BI INCL CAD: CPT | Performed by: OBSTETRICS & GYNECOLOGY

## 2019-06-04 PROCEDURE — 99396 PREV VISIT EST AGE 40-64: CPT | Performed by: OBSTETRICS & GYNECOLOGY

## 2019-06-04 NOTE — PROGRESS NOTES
"Subjective     Chief Complaint   Patient presents with   • Gynecologic Exam     Annual:last pap 3/2018,mammo here today,irreg.menses         History of Present Illness      Jill M Schoenbachler is a very pleasant  48 y.o. female who presents for annual exam.  Mammo Exam today, Contraception vasectomy, Exercise twice a week but needs to increase her resistance  Patient is up-to-date on her wellness labs.  She was having some perimenopausal symptoms such as hot flashes but since her cycles have returned the hot flashes have disappeared she has no other gynecologic concerns or worries.      Obstetric History:  OB History     No data available         Menstrual History:     Patient's last menstrual period was 2019.       Sexual History:       Past Medical History:   Diagnosis Date   • Allergic rhinitis    • Anxiety    • Anxiety, generalized    • Atypical chest pain    • GERD (gastroesophageal reflux disease)    • History of pregnancy      5, 2 MISCARRIAGES   • Hypercholesterolemia    • Hyperlipidemia    • Low back pain    • Lymphadenopathy    • Miscarriage     X2   • Panic attack    • Stress reaction      Past Surgical History:   Procedure Laterality Date   • NO PAST SURGERIES         SOCIAL Hx:      The following portions of the patient's history were reviewed and updated as appropriate: allergies, current medications, past family history, past medical history, past social history, past surgical history and problem list.    Review of Systems        Except as outlined in history of physical illness, patient denies any changes in her GYN, , GI systems.  All other systems reviewed were negative.         Objective   Physical Exam    /68   Ht 160 cm (63\")   Wt 57.6 kg (127 lb)   LMP 2019   BMI 22.50 kg/m²     General: Patient is alert and oriented and appears overall healthy  Neck: Is supple without thyromegaly, no carotid bruits and no lymphadenopathy  Lungs: Clear bilaterally, no wheezing, " rhonchi, or rales.  Respiratory rate is normal  Breast: Even, symmetrical, no lymphadenopathy, no retraction, no masses or cysts  Heart: Regular rate and rhythm are appreciated, no murmurs or rubs are heard  Abdomen: Is soft, without organomegaly, bowel sounds are positive, there is no                                rebound or guarding and palpation does not produce any discomfort  Back: Nontender without CVA tenderness  Pelvic: External genitalia appear normal and consistent with mature female.  BUS normal                            Vagina is clean dry without discharge and appears adequately estrogenized, no               lesions or masses are present                         Cervix is noninflamed without discharge or lesions.  There is no cervical motion             tenderness.                Uterus is nonenlarged, without tenderness, and no masses or abnormalities are  present               Adnexa are non-enlarged, non tender               Rectal exam reveals adequate sphincter tone and no masses or lesions are                     appreciated on digital rectal examination.      Annual Well Woman Exam  Patient Active Problem List   Diagnosis   • Vitamin D deficiency   • Acute pharyngitis   • Lymphadenopathy   • Acute URI   • Hyperlipidemia   • Chronic bilateral low back pain without sciatica   • Family history of abdominal aortic aneurysm (AAA)   • Impaired glucose tolerance                 Assessment/Plan   Mary Jane was seen today for gynecologic exam.    Diagnoses and all orders for this visit:    Encounter for gynecological examination  -     IGP, Apt HPV,rfx 16 / 18,45    Perimenopausal            Discussed today's findings and concerns with patient.  Continue to recommend regular exercise including cardiovascular and resistance training as well as  breast self-exam. Wellness lab, mammography, & pap smear, in accordance with age guidelines.        All of the patient's questions were addressed and answered, I have  encouraged her to call for today's test results if she has not received them within 10 days.  Patient is advised to call with any change in her condition or with any other questions, otherwise return in 12 months for annual examination.

## 2019-06-07 LAB
CYTOLOGIST CVX/VAG CYTO: NORMAL
CYTOLOGY CVX/VAG DOC CYTO: NORMAL
CYTOLOGY CVX/VAG DOC THIN PREP: NORMAL
DX ICD CODE: NORMAL
HIV 1 & 2 AB SER-IMP: NORMAL
HPV I/H RISK 4 DNA CVX QL PROBE+SIG AMP: NEGATIVE
Lab: NORMAL
OTHER STN SPEC: NORMAL
STAT OF ADQ CVX/VAG CYTO-IMP: NORMAL

## 2019-06-10 DIAGNOSIS — R92.8 ABNORMAL MAMMOGRAM: Primary | ICD-10-CM

## 2019-06-11 ENCOUNTER — TELEPHONE (OUTPATIENT)
Dept: OBSTETRICS AND GYNECOLOGY | Age: 49
End: 2019-06-11

## 2019-06-12 ENCOUNTER — APPOINTMENT (OUTPATIENT)
Dept: WOMENS IMAGING | Facility: HOSPITAL | Age: 49
End: 2019-06-12

## 2019-06-12 PROCEDURE — G0279 TOMOSYNTHESIS, MAMMO: HCPCS | Performed by: RADIOLOGY

## 2019-06-12 PROCEDURE — 77065 DX MAMMO INCL CAD UNI: CPT | Performed by: RADIOLOGY

## 2019-06-12 PROCEDURE — 76641 ULTRASOUND BREAST COMPLETE: CPT | Performed by: RADIOLOGY

## 2019-06-12 PROCEDURE — 77061 BREAST TOMOSYNTHESIS UNI: CPT | Performed by: RADIOLOGY

## 2019-06-17 ENCOUNTER — TELEPHONE (OUTPATIENT)
Dept: OBSTETRICS AND GYNECOLOGY | Age: 49
End: 2019-06-17

## 2019-06-17 NOTE — ADDENDUM NOTE
Addended by: CARMINA DAMICO on: 6/17/2019 12:16 PM     Modules accepted: Orders     No adenopathy or splenomegaly. No cervical or inguinal lymphadenopathy.

## 2020-01-02 ENCOUNTER — OFFICE VISIT (OUTPATIENT)
Dept: FAMILY MEDICINE CLINIC | Facility: CLINIC | Age: 50
End: 2020-01-02

## 2020-01-02 VITALS
TEMPERATURE: 98 F | WEIGHT: 126 LBS | BODY MASS INDEX: 22.32 KG/M2 | HEIGHT: 63 IN | OXYGEN SATURATION: 98 % | RESPIRATION RATE: 16 BRPM | SYSTOLIC BLOOD PRESSURE: 110 MMHG | HEART RATE: 90 BPM | DIASTOLIC BLOOD PRESSURE: 70 MMHG

## 2020-01-02 DIAGNOSIS — J02.9 SORE THROAT: ICD-10-CM

## 2020-01-02 DIAGNOSIS — J02.9 PHARYNGITIS, UNSPECIFIED ETIOLOGY: Primary | ICD-10-CM

## 2020-01-02 LAB
EXPIRATION DATE: NORMAL
INTERNAL CONTROL: NORMAL
Lab: NORMAL
S PYO AG THROAT QL: NEGATIVE

## 2020-01-02 PROCEDURE — 87880 STREP A ASSAY W/OPTIC: CPT | Performed by: NURSE PRACTITIONER

## 2020-01-02 PROCEDURE — 99213 OFFICE O/P EST LOW 20 MIN: CPT | Performed by: NURSE PRACTITIONER

## 2020-01-02 RX ORDER — METHYLPREDNISOLONE 4 MG/1
TABLET ORAL
Qty: 1 EACH | Refills: 0 | Status: SHIPPED | OUTPATIENT
Start: 2020-01-02 | End: 2020-02-06

## 2020-01-02 RX ORDER — AZITHROMYCIN 250 MG/1
TABLET, FILM COATED ORAL
Qty: 6 TABLET | Refills: 0 | Status: SHIPPED | OUTPATIENT
Start: 2020-01-02 | End: 2020-02-06

## 2020-01-02 NOTE — PATIENT INSTRUCTIONS
Pharyngitis    Pharyngitis is a sore throat (pharynx). This is when there is redness, pain, and swelling in your throat. Most of the time, this condition gets better on its own. In some cases, you may need medicine.  Follow these instructions at home:  · Take over-the-counter and prescription medicines only as told by your doctor.  ? If you were prescribed an antibiotic medicine, take it as told by your doctor. Do not stop taking the antibiotic even if you start to feel better.  ? Do not give children aspirin. Aspirin has been linked to Reye syndrome.  · Drink enough water and fluids to keep your pee (urine) clear or pale yellow.  · Get a lot of rest.  · Rinse your mouth (gargle) with a salt-water mixture 3-4 times a day or as needed. To make a salt-water mixture, completely dissolve ½-1 tsp of salt in 1 cup of warm water.  · If your doctor approves, you may use throat lozenges or sprays to soothe your throat.  Contact a doctor if:  · You have large, tender lumps in your neck.  · You have a rash.  · You cough up green, yellow-brown, or bloody spit.  Get help right away if:  · You have a stiff neck.  · You drool or cannot swallow liquids.  · You cannot drink or take medicines without throwing up.  · You have very bad pain that does not go away with medicine.  · You have problems breathing, and it is not from a stuffy nose.  · You have new pain and swelling in your knees, ankles, wrists, or elbows.  Summary  · Pharyngitis is a sore throat (pharynx). This is when there is redness, pain, and swelling in your throat.  · If you were prescribed an antibiotic medicine, take it as told by your doctor. Do not stop taking the antibiotic even if you start to feel better.  · Most of the time, pharyngitis gets better on its own. Sometimes, you may need medicine.  This information is not intended to replace advice given to you by your health care provider. Make sure you discuss any questions you have with your health care  provider.  Document Released: 06/05/2009 Document Revised: 01/23/2018 Document Reviewed: 01/23/2018  [employer] Interactive Patient Education © 2019 [employer]

## 2020-01-02 NOTE — PROGRESS NOTES
"Chief Complaint   Patient presents with   • Swollen Glands   • Laryngitis   • Earache       Upper Respiratory Infection: Patient complains of symptoms of a URI. Symptoms include bilateral ear pain, congestion, cough, sore throat and swollen glands. Onset of symptoms was 1 week ago, gradually worsening since that time. She also c/o achiness, congestion and sinus pressure for the past 1 week .  She is drinking moderate amounts of fluids. Evaluation to date: none. Treatment to date: cough suppressants.  Ill contacts at home or school or work discussed.   with similar symptoms of cough and congestion.      The following portions of the patient's history were reviewed and updated as appropriate: allergies, current medications, past family history, past medical history, past social history, past surgical history and problem list.    A comprehensive review of systems was negative except for: Constitutional: positive for fatigue  Ears, nose, mouth, throat, and face: positive for earaches, hoarseness, nasal congestion and sore throat  Respiratory: positive for cough    Vitals:    01/02/20 1406   BP: 110/70   BP Location: Left arm   Patient Position: Sitting   Cuff Size: Adult   Pulse: 90   Resp: 16   Temp: 98 °F (36.7 °C)   SpO2: 98%   Weight: 57.2 kg (126 lb)   Height: 160 cm (63\")     Gen: Mildly ill appearing, alert, hoarse voice noted  Ears: TM's bulging without redness  Nose:  Congestion  Throat:  Red without exudate, some drainage  Neck: Tender cervical lymph nodes  Lung: Good air movement, regular RR  Heart: RR without murmur  Skin: No rash      Assessment/Plan   Mary Jane was seen today for swollen glands, laryngitis and earache.    Diagnoses and all orders for this visit:    Pharyngitis, unspecified etiology    Sore throat  -     POCT rapid strep A    Other orders  -     azithromycin (ZITHROMAX Z-EMIGDIO) 250 MG tablet; Take 2 tablets the first day, then 1 tablet daily for 4 days.  -     methylPREDNISolone (MEDROL, " EMIGDIO) 4 MG tablet; Take as directed on package instructions.             Patient Instructions   Pharyngitis    Pharyngitis is a sore throat (pharynx). This is when there is redness, pain, and swelling in your throat. Most of the time, this condition gets better on its own. In some cases, you may need medicine.  Follow these instructions at home:  · Take over-the-counter and prescription medicines only as told by your doctor.  ? If you were prescribed an antibiotic medicine, take it as told by your doctor. Do not stop taking the antibiotic even if you start to feel better.  ? Do not give children aspirin. Aspirin has been linked to Reye syndrome.  · Drink enough water and fluids to keep your pee (urine) clear or pale yellow.  · Get a lot of rest.  · Rinse your mouth (gargle) with a salt-water mixture 3-4 times a day or as needed. To make a salt-water mixture, completely dissolve ½-1 tsp of salt in 1 cup of warm water.  · If your doctor approves, you may use throat lozenges or sprays to soothe your throat.  Contact a doctor if:  · You have large, tender lumps in your neck.  · You have a rash.  · You cough up green, yellow-brown, or bloody spit.  Get help right away if:  · You have a stiff neck.  · You drool or cannot swallow liquids.  · You cannot drink or take medicines without throwing up.  · You have very bad pain that does not go away with medicine.  · You have problems breathing, and it is not from a stuffy nose.  · You have new pain and swelling in your knees, ankles, wrists, or elbows.  Summary  · Pharyngitis is a sore throat (pharynx). This is when there is redness, pain, and swelling in your throat.  · If you were prescribed an antibiotic medicine, take it as told by your doctor. Do not stop taking the antibiotic even if you start to feel better.  · Most of the time, pharyngitis gets better on its own. Sometimes, you may need medicine.  This information is not intended to replace advice given to you by your  health care provider. Make sure you discuss any questions you have with your health care provider.  Document Released: 06/05/2009 Document Revised: 01/23/2018 Document Reviewed: 01/23/2018  [employer] Interactive Patient Education © 2019 [employer]        Tylenol or Advil as needed for pain, fever, muscle aches  Plenty of fluids  Hand washing discussed  Off work or school note given if needed.  Warm tea for throat.  Pros and cons of antibiotic use discussed    Josephine Correa, BEKA  Family Practice  [facility]

## 2020-01-02 NOTE — PROGRESS NOTES
Patient ID: Jill M Schoenbachler is a 49 y.o. female     Subjective     No chief complaint on file.      History of Present Illness    Jill M Schoenbachler presents to the office today for ***.   She denies any complaints of fever, chills, cough, chest pain, shortness of air, abdominal pain, nausea, or any other concerns.     The following portions of the patient's history were reviewed and updated as appropriate: allergies, current medications, past family history, past medical history, past social history, past surgical history and problem list.       ROS    There were no vitals filed for this visit.    There were no vitals filed for this visit.  There is no height or weight on file to calculate BMI.    Results for orders placed or performed in visit on 06/04/19   IGP, Apt HPV,rfx 16 / 18,45   Result Value Ref Range    Diagnosis Comment     Specimen adequacy: Comment     Clinician Provided ICD-10: Comment     Performed by: Comment     QC reviewed by: Comment     . .     Note: Comment     Method: Comment     HPV Aptima Negative Negative       Objective     Physical Exam       Assessment/Plan     Assessment/Plan     {Assess/PlanSmartLinks:98453}      Summary:  Jill M Schoenbachler ***    In the meantime, instructed to contact us sooner for any problems or concerns.    Josephine Correa, APRN  Family Medicine  [facility]  01/02/20  12:22 PM

## 2020-02-06 ENCOUNTER — OFFICE VISIT (OUTPATIENT)
Dept: FAMILY MEDICINE CLINIC | Facility: CLINIC | Age: 50
End: 2020-02-06

## 2020-02-06 VITALS
TEMPERATURE: 98 F | BODY MASS INDEX: 23.39 KG/M2 | HEART RATE: 78 BPM | DIASTOLIC BLOOD PRESSURE: 66 MMHG | SYSTOLIC BLOOD PRESSURE: 100 MMHG | HEIGHT: 63 IN | OXYGEN SATURATION: 98 % | RESPIRATION RATE: 16 BRPM | WEIGHT: 132 LBS

## 2020-02-06 DIAGNOSIS — R31.9 URINARY TRACT INFECTION WITH HEMATURIA, SITE UNSPECIFIED: ICD-10-CM

## 2020-02-06 DIAGNOSIS — N39.0 URINARY TRACT INFECTION WITH HEMATURIA, SITE UNSPECIFIED: ICD-10-CM

## 2020-02-06 DIAGNOSIS — R30.0 DYSURIA: Primary | ICD-10-CM

## 2020-02-06 LAB
BILIRUB BLD-MCNC: NEGATIVE MG/DL
CLARITY, POC: ABNORMAL
COLOR UR: YELLOW
GLUCOSE UR STRIP-MCNC: NEGATIVE MG/DL
KETONES UR QL: NEGATIVE
LEUKOCYTE EST, POC: NEGATIVE
NITRITE UR-MCNC: NEGATIVE MG/ML
PH UR: 6.5 [PH] (ref 5–8)
PROT UR STRIP-MCNC: NEGATIVE MG/DL
RBC # UR STRIP: ABNORMAL /UL
SP GR UR: 1.01 (ref 1–1.03)
UROBILINOGEN UR QL: NORMAL

## 2020-02-06 PROCEDURE — 99213 OFFICE O/P EST LOW 20 MIN: CPT | Performed by: NURSE PRACTITIONER

## 2020-02-06 PROCEDURE — 81002 URINALYSIS NONAUTO W/O SCOPE: CPT | Performed by: NURSE PRACTITIONER

## 2020-02-06 RX ORDER — NITROFURANTOIN 25; 75 MG/1; MG/1
100 CAPSULE ORAL EVERY 12 HOURS SCHEDULED
Qty: 14 CAPSULE | Refills: 0 | Status: SHIPPED | OUTPATIENT
Start: 2020-02-06 | End: 2020-02-13

## 2020-02-06 NOTE — PROGRESS NOTES
"Chief Complaint   Patient presents with   • Urinary Tract Infection       Urinary Tract Infection: Patient complains of incomplete bladder emptying and suprapubic pressure She has had symptoms for 1 day. Patient also complains of N/A. Patient denies fever. Patient does not have a history of recurrent UTI.  Patient does not have a history of pyelonephritis.     The following portions of the patient's history were reviewed and updated as appropriate: allergies, current medications, past family history, past medical history, past social history, past surgical history and problem list.    Review of Systems   Constitutional: Negative.    HENT: Negative.    Respiratory: Negative.    Cardiovascular: Negative.    Gastrointestinal: Negative.    Endocrine: Negative.    Genitourinary: Positive for frequency.        Suprapubic pressure     Musculoskeletal: Negative.    Skin: Negative.    Neurological: Negative.    Hematological: Negative.    Psychiatric/Behavioral: Negative.        Vitals:    02/06/20 1541   BP: 100/66   BP Location: Left arm   Patient Position: Sitting   Cuff Size: Adult   Pulse: 78   Resp: 16   Temp: 98 °F (36.7 °C)   SpO2: 98%   Weight: 59.9 kg (132 lb)   Height: 160 cm (63\")     Gen: Well appearing, alert  HEENT: WNL  Lung: Regular RR, no audible wheeze  Heart: RR without murmur  Skin: No rash, W/D  Abd: Non tender, non distended, positive bowel sounds  Flank: No CVA, no rash    In office urine dipstick results:  Brief Urine Lab Results  (Last result in the past 365 days)      Color   Clarity   Blood   Leuk Est   Nitrite   Protein   CREAT   Urine HCG        02/06/20 1554 Yellow Hazy 1+ Negative Negative Negative             Assessment/Plan   Mary Jane was seen today for urinary tract infection.    Diagnoses and all orders for this visit:    Dysuria  -     POCT urinalysis dipstick, manual  -     Urine Culture - Urine, Urine, Clean Catch    Urinary tract infection with hematuria, site unspecified  -     " nitrofurantoin, macrocrystal-monohydrate, (MACROBID) 100 MG capsule; Take 1 capsule by mouth Every 12 (Twelve) Hours for 7 days.           Tylenol or Advil as needed for pain, fever  Plenty of fluids  OTC Azo ok  Off work or school note given if needed.  Pros and cons of antibiotic use discussed    Josephine Correa, BEKA  Family Practice  [facility]

## 2020-02-08 LAB
BACTERIA UR CULT: ABNORMAL
BACTERIA UR CULT: ABNORMAL

## 2020-02-10 NOTE — PROGRESS NOTES
Notify Jill M Schoenbachler her urine culture did show she has a UTI, however the antibiotic she was given possibly will not treat her type of infection.  See how she is doing?  If no improvement, need to switch to Augmentin for better coverage.

## 2020-02-12 ENCOUNTER — TELEPHONE (OUTPATIENT)
Dept: FAMILY MEDICINE CLINIC | Facility: CLINIC | Age: 50
End: 2020-02-12

## 2020-02-12 DIAGNOSIS — N30.00 ACUTE CYSTITIS WITHOUT HEMATURIA: Primary | ICD-10-CM

## 2020-02-12 RX ORDER — AMOXICILLIN AND CLAVULANATE POTASSIUM 875; 125 MG/1; MG/1
1 TABLET, FILM COATED ORAL 2 TIMES DAILY
Qty: 14 TABLET | Refills: 0 | Status: SHIPPED | OUTPATIENT
Start: 2020-02-12 | End: 2020-02-19

## 2020-02-12 NOTE — TELEPHONE ENCOUNTER
Pt called stating she received a call from Nataly yesterday. She saw Josephine on Thursday for a UTI. She was started in Macrobid. The culture came back stating that it was resistant to Macrobid. Josephine stated if the patient was still having symptoms that she would need to be changed to Augmentin. Could you please call this into her pharmacy as it was not sent in yesterday.

## 2020-02-12 NOTE — TELEPHONE ENCOUNTER
I sent in Augmentin. Chart states rash with cefdinir, she should tolerate the Amoxicillin-clavulanate unless she has had an issue not listed with it in her chart

## 2020-02-19 ENCOUNTER — TELEPHONE (OUTPATIENT)
Dept: FAMILY MEDICINE CLINIC | Facility: CLINIC | Age: 50
End: 2020-02-19

## 2020-02-19 NOTE — TELEPHONE ENCOUNTER
Pt saw Josephine on 2/6/2020. She is still experiencing symptoms of a UTI. She asked if she needs to be seen again or if we could call in another prescription.

## 2020-02-20 RX ORDER — LEVOFLOXACIN 500 MG/1
500 TABLET, FILM COATED ORAL DAILY
Qty: 7 TABLET | Refills: 0 | Status: SHIPPED | OUTPATIENT
Start: 2020-02-20 | End: 2020-11-12

## 2020-02-20 NOTE — TELEPHONE ENCOUNTER
Sent in prescription for Levaquin.  Take once a day as directed for 7 days.  Drink plenty of fluids.  Notify us if no improvement.

## 2020-03-02 ENCOUNTER — TELEPHONE (OUTPATIENT)
Dept: OBSTETRICS AND GYNECOLOGY | Age: 50
End: 2020-03-02

## 2020-03-02 ENCOUNTER — OFFICE VISIT (OUTPATIENT)
Dept: OBSTETRICS AND GYNECOLOGY | Age: 50
End: 2020-03-02

## 2020-03-02 VITALS
WEIGHT: 128 LBS | SYSTOLIC BLOOD PRESSURE: 104 MMHG | HEIGHT: 63 IN | DIASTOLIC BLOOD PRESSURE: 70 MMHG | BODY MASS INDEX: 22.68 KG/M2

## 2020-03-02 DIAGNOSIS — N30.00 ACUTE CYSTITIS WITHOUT HEMATURIA: Primary | ICD-10-CM

## 2020-03-02 LAB
BILIRUB BLD-MCNC: NEGATIVE MG/DL
CLARITY, POC: CLEAR
COLOR UR: YELLOW
GLUCOSE UR STRIP-MCNC: NEGATIVE MG/DL
KETONES UR QL: NEGATIVE
LEUKOCYTE EST, POC: NEGATIVE
NITRITE UR-MCNC: NEGATIVE MG/ML
PH UR: 7 [PH] (ref 5–8)
PROT UR STRIP-MCNC: NEGATIVE MG/DL
RBC # UR STRIP: NEGATIVE /UL
SP GR UR: 1.01 (ref 1–1.03)
UROBILINOGEN UR QL: NORMAL

## 2020-03-02 PROCEDURE — 81002 URINALYSIS NONAUTO W/O SCOPE: CPT | Performed by: OBSTETRICS & GYNECOLOGY

## 2020-03-02 PROCEDURE — 99213 OFFICE O/P EST LOW 20 MIN: CPT | Performed by: OBSTETRICS & GYNECOLOGY

## 2020-03-02 NOTE — TELEPHONE ENCOUNTER
(Link pt) Pt was treated for a uti around 3 weeks ago originally given Macrobid, after urine culture came back she was given another prescription. Pt is still having a lot of discomfort in her pelvic area and it has become more consistent. Pt doesn't know if she needs to have her ovaries checked or if she possibly has a kidney infection. Please advise.     [phone]

## 2020-03-02 NOTE — PROGRESS NOTES
Subjective     Chief Complaint   Patient presents with   • Gynecologic Exam     ? recurrent uti,pelvic discomfort       History of Present Illness  Jill M Schoenbachler is a 49 y.o. female is being seen today for follow-up evaluation for some pelvic discomfort.  Patient has been treated by her family physician with 2 or 3 antibiotics for possible urinary tract infection.  She is also self treated herself with some Monistat.  And she was anxious to make sure there is nothing else going on.  Chief Complaint   Patient presents with   • Gynecologic Exam     ? recurrent uti,pelvic discomfort   .        The following portions of the patient's history were reviewed and updated as appropriate: allergies, current medications, past family history, past medical history, past social history, past surgical history and problem list.    PAST MEDICAL HISTORY  Past Medical History:   Diagnosis Date   • Allergic rhinitis    • Anxiety    • Anxiety, generalized    • Atypical chest pain    • GERD (gastroesophageal reflux disease)    • History of pregnancy      5, 2 MISCARRIAGES   • Hypercholesterolemia    • Hyperlipidemia    • Low back pain    • Lymphadenopathy    • Miscarriage     X2   • Panic attack    • Stress reaction      OB History   No data available     Past Surgical History:   Procedure Laterality Date   • NO PAST SURGERIES       Family History   Problem Relation Age of Onset   • Hypertension Mother    • Aneurysm Father         AORTIC     Social History     Tobacco Use   Smoking Status Never Smoker   Smokeless Tobacco Never Used       Current Outpatient Medications:   •  cholecalciferol (VITAMIN D3) 1000 units tablet, Take 1,000 Units by mouth Daily., Disp: , Rfl:   •  levoFLOXacin (LEVAQUIN) 500 MG tablet, Take 1 tablet by mouth Daily., Disp: 7 tablet, Rfl: 0  Immunization History   Administered Date(s) Administered   • Flu Mist 01/10/2013   • Flu Vaccine Intradermal Quad 18-64YR 10/17/2018   • Tdap 2018        Review of Systems       Except as outlined in history of physical illness, patient denies any changes in her GYN, , GI systems. All other systems reviewed are negative.    Objective   Physical Exam   Alert and oriented, respirations unlabored, heart regular rate and rhythm   Pelvic external genitalia normal vagina clean dry intact cervix uterus nonenlarged nontender adnexa nonenlarged nontender.  Rectal exams normal        Assessment/Plan   Mary Jane was seen today for gynecologic exam.    Diagnoses and all orders for this visit:    Acute cystitis without hematuria  -     POC Urinalysis Dipstick    As outlined above, patient has been treated with several antibiotics by her PCP and likely developed a yeast infection which she has self medicated with Monistat.  Today's exam is reassuring.  Urinalysis is negative for blood nitrites and leukocytes.  She will call us with any problems questions concerns and I have offered her a urologic evaluation but she declines at present                     EMR Dragon/ Transcription disclaimer:  Much of the encounter note is an electronic transcription/translation of spoken language to printed text. The electronic translation of spoken language may permit erroneous, or at times, nonessential words or phrases to be inadvertently transcribes; Although i have reviewed the note for such errors, some may still exist.

## 2020-07-26 NOTE — TELEPHONE ENCOUNTER
----- Message from Josephine Rodriguez sent at 6/10/2019  1:53 PM EDT -----      ----- Message -----  From: Aravind Joaquin MD  Sent: 6/10/2019  12:22 PM  To: Josephine Rodriguez    Orders have been placed, please notify patient  
Patient not'd.She will await a call from scheduling .  
36.4

## 2020-09-02 ENCOUNTER — OFFICE VISIT (OUTPATIENT)
Dept: OBSTETRICS AND GYNECOLOGY | Age: 50
End: 2020-09-02

## 2020-09-02 ENCOUNTER — APPOINTMENT (OUTPATIENT)
Dept: WOMENS IMAGING | Facility: HOSPITAL | Age: 50
End: 2020-09-02

## 2020-09-02 ENCOUNTER — PROCEDURE VISIT (OUTPATIENT)
Dept: OBSTETRICS AND GYNECOLOGY | Age: 50
End: 2020-09-02

## 2020-09-02 VITALS
DIASTOLIC BLOOD PRESSURE: 64 MMHG | WEIGHT: 129 LBS | BODY MASS INDEX: 22.86 KG/M2 | SYSTOLIC BLOOD PRESSURE: 110 MMHG | HEIGHT: 63 IN

## 2020-09-02 DIAGNOSIS — Z01.419 ENCOUNTER FOR GYNECOLOGICAL EXAMINATION: Primary | ICD-10-CM

## 2020-09-02 DIAGNOSIS — Z12.31 VISIT FOR SCREENING MAMMOGRAM: Primary | ICD-10-CM

## 2020-09-02 PROCEDURE — 77067 SCR MAMMO BI INCL CAD: CPT | Performed by: OBSTETRICS & GYNECOLOGY

## 2020-09-02 PROCEDURE — 77067 SCR MAMMO BI INCL CAD: CPT | Performed by: RADIOLOGY

## 2020-09-02 PROCEDURE — 99396 PREV VISIT EST AGE 40-64: CPT | Performed by: OBSTETRICS & GYNECOLOGY

## 2020-09-02 RX ORDER — MULTIVIT WITH MINERALS/LUTEIN
250 TABLET ORAL DAILY
COMMUNITY
End: 2022-01-14

## 2020-09-02 NOTE — PROGRESS NOTES
Subjective   Chief Complaint   Patient presents with   • Gynecologic Exam     Annual:last pap ,mammo here today,colonoscopy:n/a      History of Present Illness    Jill M Schoenbachler is a very pleasant  50 y.o. female who presents for annual exam.  , Mammo Exam order and to be done today, , Exercise 3 times a week  Patient still has hot flashes and postmenopausal early stages.  She has no gynecological concerns or complaints.  She does not want any type of HRT to manage and does not want any medicines for her perimenopausal symptoms.  We are going to place an order for colonoscopy and DEXA scan.  She is overdue for some wellness labs but not fasting so I have given her some orders and she will come in in a fasting state in the next week or 2.  She is still working at the [employer] and likes that.  They are exercising regularly.  Her children are doing well..    Obstetric History:  OB History    None        Menstrual History:     No LMP recorded. Patient is perimenopausal.       Sexual History:       Past Medical History:   Diagnosis Date   • Allergic rhinitis    • Anxiety    • Anxiety, generalized    • Atypical chest pain    • GERD (gastroesophageal reflux disease)    • History of pregnancy      5, 2 MISCARRIAGES   • Hypercholesterolemia    • Hyperlipidemia    • Low back pain    • Lymphadenopathy    • Miscarriage     X2   • Panic attack    • Stress reaction      Past Surgical History:   Procedure Laterality Date   • NO PAST SURGERIES         SOCIAL Hx:      The following portions of the patient's history were reviewed and updated as appropriate: allergies, current medications, past family history, past medical history, past social history, past surgical history and problem list.    Review of Systems        Except as outlined in history of physical illness, patient denies any changes in her GYN, , GI systems.  All other systems reviewed are negative         Objective   Physical Exam    /64   Ht  "160 cm (63\")   Wt 58.5 kg (129 lb)   Breastfeeding No   BMI 22.85 kg/m²     General: Patient is alert and oriented and appears overall healthy  Neck: Is supple without thyromegaly, no carotid bruits and no lymphadenopathy  Lungs: Clear bilaterally, no wheezing, rhonchi, or rales.  Respiratory rate is normal  Breast: Even symmetrical, no lymphadenopathy, no retraction, no masses appreciated on either side  Heart: Regular rate and rhythm are appreciated, no murmurs or rubs are heard  Abdomen: Is soft, without organomegaly, bowel sounds are positive, there is no                                rebound or guarding and palpation does not produce any discomfort  Back: Nontender without CVA tenderness  Pelvic: External genitalia appear normal and consistent with mature female.  BUS normal              Urethra appears normal and without mass, bladder is nontender and without any               Masses.               Vagina is clean dry without discharge and appears adequately estrogenized, no               lesions or masses are present                         Cervix is noninflamed without discharge or lesions.  There is no cervical motion             tenderness.                Uterus is nonenlarged, without tenderness, and no masses or abnormalities are  present               Adnexa are non-enlarged, non tender               Rectal exam reveals adequate sphincter tone and no masses or lesions are                     appreciated on digital rectal examination.      Annual Well Woman Exam     Patient Active Problem List   Diagnosis   • Vitamin D deficiency   • Acute pharyngitis   • Lymphadenopathy   • Acute URI   • Hyperlipidemia   • Chronic bilateral low back pain without sciatica   • Family history of abdominal aortic aneurysm (AAA)   • Impaired glucose tolerance                Assessment/Plan   Mary Jane was seen today for gynecologic exam.    Diagnoses and all orders for this visit:    Encounter for gynecological examination  - "     IGP, Apt HPV,rfx 16 / 18,45  -     DEXA Bone Density Axial; Future  -     Ambulatory Referral to General Surgery  -     CBC (No Diff)  -     Comprehensive Metabolic Panel  -     Thyroid Cascade Profile  -     Lipid Panel  -     Vitamin D 25 Hydroxy    Baseline colonoscopy and DEXA scan  Patient will call if she does want some medicine for her hot flashes  Recommended several books for nutrition and weight control    Discussed today's findings and concerns with patient.  Continue to recommend regular exercise including cardiovascular and resistance training as well as  breast self-exam. Wellness lab, mammography, & pap smear, in accordance with age guidelines.    I have encouraged her to call for today's test results if she has not received them within 10 days.  Patient is advised to call with any change in her condition or with any other questions, otherwise return  for annual examination.

## 2020-09-04 LAB
CYTOLOGIST CVX/VAG CYTO: NORMAL
CYTOLOGY CVX/VAG DOC CYTO: NORMAL
CYTOLOGY CVX/VAG DOC THIN PREP: NORMAL
DX ICD CODE: NORMAL
HIV 1 & 2 AB SER-IMP: NORMAL
HPV I/H RISK 4 DNA CVX QL PROBE+SIG AMP: NEGATIVE
OTHER STN SPEC: NORMAL
STAT OF ADQ CVX/VAG CYTO-IMP: NORMAL

## 2020-09-22 ENCOUNTER — APPOINTMENT (OUTPATIENT)
Dept: BONE DENSITY | Facility: HOSPITAL | Age: 50
End: 2020-09-22

## 2020-09-24 ENCOUNTER — HOSPITAL ENCOUNTER (OUTPATIENT)
Dept: BONE DENSITY | Facility: HOSPITAL | Age: 50
Discharge: HOME | End: 2020-09-24
Admitting: OBSTETRICS & GYNECOLOGY

## 2020-09-24 DIAGNOSIS — Z01.419 ENCOUNTER FOR GYNECOLOGICAL EXAMINATION: ICD-10-CM

## 2020-09-24 PROCEDURE — 77080 DXA BONE DENSITY AXIAL: CPT

## 2020-09-25 LAB
25(OH)D3+25(OH)D2 SERPL-MCNC: 62.9 NG/ML (ref 30–100)
ALBUMIN SERPL-MCNC: 4.6 G/DL (ref 3.5–5.2)
ALBUMIN/GLOB SERPL: 2.2 G/DL
ALP SERPL-CCNC: 91 U/L (ref 39–117)
ALT SERPL-CCNC: 16 U/L (ref 1–33)
AST SERPL-CCNC: 17 U/L (ref 1–32)
BILIRUB SERPL-MCNC: 0.3 MG/DL (ref 0–1.2)
BUN SERPL-MCNC: 14 MG/DL (ref 6–20)
BUN/CREAT SERPL: 23 (ref 7–25)
CALCIUM SERPL-MCNC: 9.2 MG/DL (ref 8.6–10.5)
CHLORIDE SERPL-SCNC: 104 MMOL/L (ref 98–107)
CHOLEST SERPL-MCNC: 225 MG/DL (ref 0–200)
CO2 SERPL-SCNC: 27.9 MMOL/L (ref 22–29)
CREAT SERPL-MCNC: 0.61 MG/DL (ref 0.57–1)
ERYTHROCYTE [DISTWIDTH] IN BLOOD BY AUTOMATED COUNT: 12.8 % (ref 12.3–15.4)
GLOBULIN SER CALC-MCNC: 2.1 GM/DL
GLUCOSE SERPL-MCNC: 88 MG/DL (ref 65–99)
HCT VFR BLD AUTO: 40.2 % (ref 34–46.6)
HDLC SERPL-MCNC: 77 MG/DL (ref 40–60)
HGB BLD-MCNC: 13.6 G/DL (ref 12–15.9)
LDLC SERPL CALC-MCNC: 140 MG/DL (ref 0–100)
MCH RBC QN AUTO: 30.6 PG (ref 26.6–33)
MCHC RBC AUTO-ENTMCNC: 33.8 G/DL (ref 31.5–35.7)
MCV RBC AUTO: 90.3 FL (ref 79–97)
PLATELET # BLD AUTO: 257 10*3/MM3 (ref 140–450)
POTASSIUM SERPL-SCNC: 5.2 MMOL/L (ref 3.5–5.2)
PROT SERPL-MCNC: 6.7 G/DL (ref 6–8.5)
RBC # BLD AUTO: 4.45 10*6/MM3 (ref 3.77–5.28)
SODIUM SERPL-SCNC: 143 MMOL/L (ref 136–145)
TRIGL SERPL-MCNC: 38 MG/DL (ref 0–150)
TSH SERPL DL<=0.005 MIU/L-ACNC: 0.95 UIU/ML (ref 0.45–4.5)
VLDLC SERPL CALC-MCNC: 7.6 MG/DL
WBC # BLD AUTO: 3.18 10*3/MM3 (ref 3.4–10.8)

## 2020-09-28 ENCOUNTER — TELEPHONE (OUTPATIENT)
Dept: OBSTETRICS AND GYNECOLOGY | Age: 50
End: 2020-09-28

## 2020-09-28 NOTE — TELEPHONE ENCOUNTER
Pt.not'd of dexa results. She wants to know if you have her wellness lab results yet? Please sign off and advise. Thanks.

## 2020-09-28 NOTE — TELEPHONE ENCOUNTER
----- Message from Aravind Joaquin MD sent at 9/24/2020 12:41 PM EDT -----  Please notify pt. That labs are with in normal limits

## 2020-09-28 NOTE — TELEPHONE ENCOUNTER
Please let patient know that her labs overall were reassuring she continues to have a slight elevation of her cholesterol but her good cholesterol the HDL is significantly elevated and as a result her ratios are in a good range.  Her 10-year risk factors for heart issues are in the low range.

## 2020-10-13 ENCOUNTER — TELEPHONE (OUTPATIENT)
Dept: FAMILY MEDICINE CLINIC | Facility: CLINIC | Age: 50
End: 2020-10-13

## 2020-10-13 NOTE — TELEPHONE ENCOUNTER
Dr Coronado has left and patient wants to know if you will call her something in?  She said its not like its a real drug anyways.  Please advise please

## 2020-10-13 NOTE — TELEPHONE ENCOUNTER
Dr. Coronado has openings tomorrow around 11:00.  Please have her schedule an appointment to be seen if able.

## 2020-10-13 NOTE — TELEPHONE ENCOUNTER
PATIENT BELIEVES HAS CONJUNCTIVITIS    NO AVAILABILITY UNTIL 10/15     PATIENT STATED [facility] AS [facility]    IS THERE ANY WAY MEDS CAN BE CALLED IN FOR HER EYE    Pharmacy:  [facility]  [place] - [address] [facility] AT [address] - [phone] [facility] [phone] [place]    PATIENT: [phone]

## 2020-10-14 RX ORDER — CIPROFLOXACIN HYDROCHLORIDE 3.5 MG/ML
1 SOLUTION/ DROPS TOPICAL
Qty: 5 ML | Refills: 0 | Status: SHIPPED | OUTPATIENT
Start: 2020-10-14 | End: 2020-11-12

## 2020-10-27 ENCOUNTER — TELEPHONE (OUTPATIENT)
Dept: OBSTETRICS AND GYNECOLOGY | Age: 50
End: 2020-10-27

## 2020-10-27 NOTE — TELEPHONE ENCOUNTER
Please let patient know that overall her labs look good as before her total cholesterol is slightly elevated but her ratio is excellent.  She still has a very low cardiovascular risk score.  Thyroid and vitamin D are normal metabolic profile looks great continue her exercise and good nutrition and we will repeat next year.  I do not see a Covid result

## 2020-11-12 ENCOUNTER — TELEPHONE (OUTPATIENT)
Dept: FAMILY MEDICINE CLINIC | Facility: CLINIC | Age: 50
End: 2020-11-12

## 2020-11-12 NOTE — TELEPHONE ENCOUNTER
Spoke with patient and informed her of where she can get tested. She stated that when she told her employer, they stated that since she is in an office by herself, that she does not need to be off for 2 weeks even though her  is positive. 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 She did state that she has a window in front of her.   Pt has requested a doctors note if you feel that she needs to stay off from work.   She does not have any symptoms.

## 2020-11-12 NOTE — TELEPHONE ENCOUNTER
Patient called in stating her husbands covid test was positive. She would like to know where to go to get tested.    Best call back # [phone]

## 2020-11-13 ENCOUNTER — TELEPHONE (OUTPATIENT)
Dept: OBSTETRICS AND GYNECOLOGY | Age: 50
End: 2020-11-13

## 2020-11-23 ENCOUNTER — PREP FOR SURGERY (OUTPATIENT)
Dept: OTHER | Facility: HOSPITAL | Age: 50
End: 2020-11-23

## 2020-11-23 DIAGNOSIS — Z12.11 ENCOUNTER FOR SCREENING COLONOSCOPY: Primary | ICD-10-CM

## 2020-12-15 PROBLEM — Z12.11 ENCOUNTER FOR SCREENING COLONOSCOPY: Status: ACTIVE | Noted: 2020-12-15

## 2021-01-25 ENCOUNTER — TELEPHONE (OUTPATIENT)
Dept: FAMILY MEDICINE CLINIC | Facility: CLINIC | Age: 51
End: 2021-01-25

## 2021-01-25 NOTE — TELEPHONE ENCOUNTER
OKAY FOR HUB TO SHARE  Patient needs to schedule an appt with Dr. Coronado.     Patient called and LM stating she thinks she has a UTI and would like to drop off a U/A.     Patient has not been seen by Dr. Coronado since 04.09.19 or had labs since that time as well.   Patient did see BEKA Alan on 02.06.20.     Pt has no upcoming appts.     Tried to return call to pt and there was no answer and no VM.

## 2021-01-26 ENCOUNTER — OFFICE VISIT (OUTPATIENT)
Dept: FAMILY MEDICINE CLINIC | Facility: CLINIC | Age: 51
End: 2021-01-26

## 2021-01-26 VITALS
DIASTOLIC BLOOD PRESSURE: 70 MMHG | HEART RATE: 84 BPM | BODY MASS INDEX: 22.36 KG/M2 | WEIGHT: 131 LBS | OXYGEN SATURATION: 98 % | HEIGHT: 64 IN | SYSTOLIC BLOOD PRESSURE: 118 MMHG

## 2021-01-26 DIAGNOSIS — R30.0 DYSURIA: Primary | ICD-10-CM

## 2021-01-26 DIAGNOSIS — Z22.330 GBS CARRIER: ICD-10-CM

## 2021-01-26 PROBLEM — R59.1 LYMPHADENOPATHY: Status: RESOLVED | Noted: 2019-01-30 | Resolved: 2021-01-26

## 2021-01-26 PROBLEM — J02.9 ACUTE PHARYNGITIS: Status: RESOLVED | Noted: 2019-01-30 | Resolved: 2021-01-26

## 2021-01-26 PROBLEM — J06.9 ACUTE URI: Status: RESOLVED | Noted: 2019-02-11 | Resolved: 2021-01-26

## 2021-01-26 LAB
BILIRUB BLD-MCNC: NEGATIVE MG/DL
CLARITY, POC: CLEAR
COLOR UR: NORMAL
GLUCOSE UR STRIP-MCNC: NEGATIVE MG/DL
KETONES UR QL: NEGATIVE
LEUKOCYTE EST, POC: NEGATIVE
NITRITE UR-MCNC: NEGATIVE MG/ML
PH UR: 5 [PH] (ref 5–8)
PROT UR STRIP-MCNC: NEGATIVE MG/DL
RBC # UR STRIP: NEGATIVE /UL
SP GR UR: 1.02 (ref 1–1.03)
UROBILINOGEN UR QL: NORMAL

## 2021-01-26 PROCEDURE — 81002 URINALYSIS NONAUTO W/O SCOPE: CPT | Performed by: FAMILY MEDICINE

## 2021-01-26 PROCEDURE — 99213 OFFICE O/P EST LOW 20 MIN: CPT | Performed by: FAMILY MEDICINE

## 2021-01-26 RX ORDER — METHENAMINE, SODIUM PHOSPHATE, MONOBASIC, MONOHYDRATE, PHENYL SALICYLATE, METHYLENE BLUE, AND HYOSCYAMINE SULFATE 120; 40.8; 36; 10; .12 MG/1; MG/1; MG/1; MG/1; MG/1
1 CAPSULE ORAL 4 TIMES DAILY
Qty: 30 CAPSULE | Refills: 0 | Status: SHIPPED | OUTPATIENT
Start: 2021-01-26 | End: 2022-01-14

## 2021-01-26 RX ORDER — AMOXICILLIN 500 MG/1
500 TABLET, FILM COATED ORAL 3 TIMES DAILY
Qty: 21 TABLET | Refills: 0 | Status: SHIPPED | OUTPATIENT
Start: 2021-01-26 | End: 2021-02-02

## 2021-01-26 NOTE — PROGRESS NOTES
"Chief Complaint   Patient presents with   • Urinary Tract Infection     straining feeling        Urinary Tract Infection: Patient complains of frequency and urgency She has had symptoms for 1 week. Patient also complains of none. Patient denies back pain, fever and vaginal discharge. Patient does have a history of recurrent UTI.  Patient does not have a history of pyelonephritis.       Vitals:    01/26/21 1329   BP: 118/70   BP Location: Left arm   Patient Position: Sitting   Cuff Size: Adult   Pulse: 84   SpO2: 98%   Weight: 59.4 kg (131 lb)   Height: 162.6 cm (64\")     Gen: mildly ill appearing, alert  Heart: RR without murmur  Skin: no rash.  Abd: non tender  Flank: no CVA, no rash    In office urine dipstick results:  Results for orders placed or performed in visit on 01/26/21   POCT urinalysis dipstick, manual    Specimen: Urine   Result Value Ref Range    Color Dark Yellow Yellow, Straw, Dark Yellow, Asiya    Clarity, UA Clear Clear    Glucose, UA Negative Negative, 1000 mg/dL (3+) mg/dL    Bilirubin Negative Negative    Ketones, UA Negative Negative    Specific Gravity  1.020 1.005 - 1.030    Blood, UA Negative Negative    pH, Urine 5.0 5.0 - 8.0    Protein, POC Negative Negative mg/dL    Urobilinogen, UA Normal Normal    Leukocytes Negative Negative    Nitrite, UA Negative Negative               Assessment/Plan   Diagnoses and all orders for this visit:    1. Dysuria (Primary)  -     POCT urinalysis dipstick, manual  -     amoxicillin (AMOXIL) 500 MG tablet; Take 1 tablet by mouth 3 (Three) Times a Day for 7 days.  Dispense: 21 tablet; Refill: 0  -     Meth-Hyo-M Bl-Na Phos-Ph Sal (Ustell) 120 MG capsule; Take 1 capsule by mouth 4 (Four) Times a Day.  Dispense: 30 capsule; Refill: 0  -     Urine Culture - Urine, Urine, Clean Catch    2. GBS carrier  -     amoxicillin (AMOXIL) 500 MG tablet; Take 1 tablet by mouth 3 (Three) Times a Day for 7 days.  Dispense: 21 tablet; Refill: 0  -     Meth-Hyo-M Bl-Na " Phos-Ph Gonzales (Ustell) 120 MG capsule; Take 1 capsule by mouth 4 (Four) Times a Day.  Dispense: 30 capsule; Refill: 0  -     Urine Culture - Urine, Urine, Clean Catch    last 2 urine cultures were + for group b strep, which may not be a cause of her UIT as it is considered normal vaginal rachelle.         Tylenol or Advil as needed for pain, fever  Plenty of fluids  OTC Azo ok  Off work or school note given if needed.  Pros and cons of antibiotic use discussed    Dr. Lino Brian MD  Family Practice  [place], [place]  [facility]

## 2021-01-28 LAB
BACTERIA UR CULT: NORMAL
BACTERIA UR CULT: NORMAL

## 2021-02-22 ENCOUNTER — TRANSCRIBE ORDERS (OUTPATIENT)
Dept: SLEEP MEDICINE | Facility: HOSPITAL | Age: 51
End: 2021-02-22

## 2021-02-22 DIAGNOSIS — Z01.818 OTHER SPECIFIED PRE-OPERATIVE EXAMINATION: Primary | ICD-10-CM

## 2021-02-23 ENCOUNTER — LAB (OUTPATIENT)
Dept: LAB | Facility: HOSPITAL | Age: 51
End: 2021-02-23

## 2021-02-23 DIAGNOSIS — Z01.818 OTHER SPECIFIED PRE-OPERATIVE EXAMINATION: ICD-10-CM

## 2021-02-23 PROCEDURE — U0004 COV-19 TEST NON-CDC HGH THRU: HCPCS

## 2021-02-23 PROCEDURE — C9803 HOPD COVID-19 SPEC COLLECT: HCPCS

## 2021-02-24 LAB — SARS-COV-2 RNA RESP QL NAA+PROBE: NOT DETECTED

## 2021-02-24 RX ORDER — ACETAMINOPHEN 500 MG
500 TABLET ORAL EVERY 6 HOURS PRN
COMMUNITY
End: 2022-01-14

## 2021-02-25 ENCOUNTER — ANESTHESIA EVENT (OUTPATIENT)
Dept: GASTROENTEROLOGY | Facility: HOSPITAL | Age: 51
End: 2021-02-25

## 2021-02-25 ENCOUNTER — HOSPITAL ENCOUNTER (OUTPATIENT)
Facility: HOSPITAL | Age: 51
Setting detail: HOSPITAL OUTPATIENT SURGERY
Discharge: HOME | End: 2021-02-25
Attending: SURGERY | Admitting: SURGERY

## 2021-02-25 ENCOUNTER — ANESTHESIA (OUTPATIENT)
Dept: GASTROENTEROLOGY | Facility: HOSPITAL | Age: 51
End: 2021-02-25

## 2021-02-25 VITALS
HEART RATE: 72 BPM | RESPIRATION RATE: 15 BRPM | HEIGHT: 64 IN | OXYGEN SATURATION: 100 % | DIASTOLIC BLOOD PRESSURE: 61 MMHG | BODY MASS INDEX: 22.49 KG/M2 | SYSTOLIC BLOOD PRESSURE: 96 MMHG

## 2021-02-25 LAB
B-HCG UR QL: NEGATIVE
INTERNAL NEGATIVE CONTROL: NEGATIVE
INTERNAL POSITIVE CONTROL: POSITIVE
Lab: NORMAL

## 2021-02-25 PROCEDURE — 45378 DIAGNOSTIC COLONOSCOPY: CPT | Performed by: SURGERY

## 2021-02-25 PROCEDURE — S0260 H&P FOR SURGERY: HCPCS | Performed by: SURGERY

## 2021-02-25 PROCEDURE — 81025 URINE PREGNANCY TEST: CPT | Performed by: SURGERY

## 2021-02-25 PROCEDURE — 25010000002 PROPOFOL 10 MG/ML EMULSION: Performed by: ANESTHESIOLOGY

## 2021-02-25 RX ORDER — SODIUM CHLORIDE, SODIUM LACTATE, POTASSIUM CHLORIDE, CALCIUM CHLORIDE 600; 310; 30; 20 MG/100ML; MG/100ML; MG/100ML; MG/100ML
1000 INJECTION, SOLUTION INTRAVENOUS CONTINUOUS
Status: DISCONTINUED | OUTPATIENT
Start: 2021-02-25 | End: 2021-02-25 | Stop reason: HOSPADM

## 2021-02-25 RX ORDER — PROPOFOL 10 MG/ML
VIAL (ML) INTRAVENOUS AS NEEDED
Status: DISCONTINUED | OUTPATIENT
Start: 2021-02-25 | End: 2021-02-25 | Stop reason: SURG

## 2021-02-25 RX ORDER — PROPOFOL 10 MG/ML
VIAL (ML) INTRAVENOUS CONTINUOUS PRN
Status: DISCONTINUED | OUTPATIENT
Start: 2021-02-25 | End: 2021-02-25 | Stop reason: SURG

## 2021-02-25 RX ADMIN — PROPOFOL 50 MG: 10 INJECTION, EMULSION INTRAVENOUS at 07:58

## 2021-02-25 RX ADMIN — PROPOFOL 100 MG: 10 INJECTION, EMULSION INTRAVENOUS at 07:38

## 2021-02-25 RX ADMIN — PROPOFOL 50 MG: 10 INJECTION, EMULSION INTRAVENOUS at 07:50

## 2021-02-25 RX ADMIN — PROPOFOL 50 MG: 10 INJECTION, EMULSION INTRAVENOUS at 07:45

## 2021-02-25 RX ADMIN — PROPOFOL 160 MCG/KG/MIN: 10 INJECTION, EMULSION INTRAVENOUS at 07:38

## 2021-02-25 RX ADMIN — PROPOFOL 50 MG: 10 INJECTION, EMULSION INTRAVENOUS at 07:39

## 2021-02-25 RX ADMIN — SODIUM CHLORIDE, POTASSIUM CHLORIDE, SODIUM LACTATE AND CALCIUM CHLORIDE 1000 ML: 600; 310; 30; 20 INJECTION, SOLUTION INTRAVENOUS at 07:24

## 2021-02-25 NOTE — ANESTHESIA PREPROCEDURE EVALUATION
Anesthesia Evaluation     Patient summary reviewed and Nursing notes reviewed   no history of anesthetic complications:               Airway   Mallampati: II  TM distance: >3 FB  Neck ROM: full  no difficulty expected  Dental - normal exam     Pulmonary - negative pulmonary ROS    breath sounds clear to auscultation  (-) shortness of breath, sleep apnea, decreased breath sounds, wheezes  Cardiovascular - normal exam  Exercise tolerance: good (4-7 METS)    Rhythm: regular  Rate: normal    (+) hyperlipidemia,   (-) past MI, angina, CHF, orthopnea, PND, CRUZ, PVD      Neuro/Psych  (+) psychiatric history Anxiety,     (-) seizures, neuromuscular disease, TIA, CVA, dizziness/light headedness, weakness, numbness  GI/Hepatic/Renal/Endo - negative ROS   (-) liver disease, diabetes    Musculoskeletal (-) negative ROS    Abdominal  - normal exam   Substance History - negative use  (-) alcohol use, drug use     OB/GYN negative ob/gyn ROS         Other - negative ROS                       Anesthesia Plan    ASA 2     MAC   (D/W pt. MAC and possible awareness intra op.  Pt understands MAC and GA are not the same and the possibility of GA being required for failed MAC)  intravenous induction     Anesthetic plan, all risks, benefits, and alternatives have been provided, discussed and informed consent has been obtained with: patient.

## 2021-02-25 NOTE — ANESTHESIA POSTPROCEDURE EVALUATION
"Patient: Jill M Schoenbachler    Procedure Summary     Date: 02/25/21 Room / Location:  [facility]    Anesthesia Start: 0731 Anesthesia Stop: 0803    Procedure: COLONOSCOPY TO CECUM (N/A ) Diagnosis:       Encounter for screening colonoscopy      (Encounter for screening colonoscopy [Z12.11])    Surgeon: Misha Armando Jr., MD Provider: Fredy Koehler MD    Anesthesia Type: MAC ASA Status: 2          Anesthesia Type: MAC    Vitals  Vitals Value Taken Time   BP 94/52 02/25/21 0804   Temp     Pulse     Resp     SpO2 99 % 02/25/21 0812   Vitals shown include unvalidated device data.        Post Anesthesia Care and Evaluation    Patient location during evaluation: bedside  Patient participation: complete - patient participated  Level of consciousness: awake and alert  Pain management: adequate  Airway patency: patent  Anesthetic complications: No anesthetic complications    Cardiovascular status: acceptable  Respiratory status: acceptable  Hydration status: acceptable    Comments: /59 (BP Location: Left arm, Patient Position: Lying)   Pulse 82   Resp 16   Ht 162.6 cm (64\")   SpO2 96%   BMI 22.49 kg/m²       "

## 2021-03-26 ENCOUNTER — BULK ORDERING (OUTPATIENT)
Dept: CASE MANAGEMENT | Facility: OTHER | Age: 51
End: 2021-03-26

## 2021-03-26 DIAGNOSIS — Z23 IMMUNIZATION DUE: ICD-10-CM

## 2022-01-14 ENCOUNTER — TELEPHONE (OUTPATIENT)
Dept: FAMILY MEDICINE CLINIC | Facility: CLINIC | Age: 52
End: 2022-01-14

## 2022-01-14 DIAGNOSIS — R06.02 SHORTNESS OF BREATH: Primary | ICD-10-CM

## 2022-01-14 DIAGNOSIS — R05.9 COUGH: ICD-10-CM

## 2022-01-14 DIAGNOSIS — Z86.16 HISTORY OF COVID-19: ICD-10-CM

## 2022-01-14 NOTE — TELEPHONE ENCOUNTER
Diagnosed over [place] for the second time.  She is having some SOB still and lingering cough.  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  Will you order?

## 2022-01-14 NOTE — TELEPHONE ENCOUNTER
Common to have lingering cough and SOA after Covid 19 infection. Can last for up to 3 months in some. Went ahead and placed order for CXR.

## 2022-03-08 ENCOUNTER — OFFICE VISIT (OUTPATIENT)
Dept: OBSTETRICS AND GYNECOLOGY | Age: 52
End: 2022-03-08

## 2022-03-08 VITALS
DIASTOLIC BLOOD PRESSURE: 70 MMHG | BODY MASS INDEX: 22.53 KG/M2 | SYSTOLIC BLOOD PRESSURE: 114 MMHG | HEIGHT: 64 IN | WEIGHT: 132 LBS

## 2022-03-08 DIAGNOSIS — K64.4 EXTERNAL HEMORRHOIDS: ICD-10-CM

## 2022-03-08 DIAGNOSIS — Z01.419 ENCOUNTER FOR GYNECOLOGICAL EXAMINATION: Primary | ICD-10-CM

## 2022-03-08 PROCEDURE — 99396 PREV VISIT EST AGE 40-64: CPT | Performed by: OBSTETRICS & GYNECOLOGY

## 2022-03-08 RX ORDER — DIPHENOXYLATE HYDROCHLORIDE AND ATROPINE SULFATE 2.5; .025 MG/1; MG/1
TABLET ORAL DAILY
COMMUNITY
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
## 2022-03-09 ENCOUNTER — TELEPHONE (OUTPATIENT)
Dept: OBSTETRICS AND GYNECOLOGY | Age: 52
End: 2022-03-09

## 2022-03-09 LAB
25(OH)D3+25(OH)D2 SERPL-MCNC: 60.9 NG/ML (ref 30–100)
ALBUMIN SERPL-MCNC: 4.6 G/DL (ref 3.8–4.9)
ALBUMIN/GLOB SERPL: 1.8 {RATIO} (ref 1.2–2.2)
ALP SERPL-CCNC: 91 IU/L (ref 44–121)
ALT SERPL-CCNC: 16 IU/L (ref 0–32)
AST SERPL-CCNC: 18 IU/L (ref 0–40)
BILIRUB SERPL-MCNC: 0.3 MG/DL (ref 0–1.2)
BUN SERPL-MCNC: 16 MG/DL (ref 6–24)
BUN/CREAT SERPL: 26 (ref 9–23)
CALCIUM SERPL-MCNC: 9.6 MG/DL (ref 8.7–10.2)
CHLORIDE SERPL-SCNC: 102 MMOL/L (ref 96–106)
CHOLEST SERPL-MCNC: 253 MG/DL (ref 100–199)
CO2 SERPL-SCNC: 24 MMOL/L (ref 20–29)
CREAT SERPL-MCNC: 0.61 MG/DL (ref 0.57–1)
EGFR GENE MUT ANL BLD/T: 107 ML/MIN/1.73
ERYTHROCYTE [DISTWIDTH] IN BLOOD BY AUTOMATED COUNT: 12.9 % (ref 11.7–15.4)
GLOBULIN SER CALC-MCNC: 2.6 G/DL (ref 1.5–4.5)
GLUCOSE SERPL-MCNC: 82 MG/DL (ref 65–99)
HCT VFR BLD AUTO: 40.5 % (ref 34–46.6)
HDLC SERPL-MCNC: 86 MG/DL
HGB BLD-MCNC: 13.5 G/DL (ref 11.1–15.9)
LDLC SERPL CALC-MCNC: 159 MG/DL (ref 0–99)
MCH RBC QN AUTO: 29.6 PG (ref 26.6–33)
MCHC RBC AUTO-ENTMCNC: 33.3 G/DL (ref 31.5–35.7)
MCV RBC AUTO: 89 FL (ref 79–97)
PLATELET # BLD AUTO: 274 X10E3/UL (ref 150–450)
POTASSIUM SERPL-SCNC: 4.3 MMOL/L (ref 3.5–5.2)
PROT SERPL-MCNC: 7.2 G/DL (ref 6–8.5)
RBC # BLD AUTO: 4.56 X10E6/UL (ref 3.77–5.28)
SODIUM SERPL-SCNC: 141 MMOL/L (ref 134–144)
TRIGL SERPL-MCNC: 49 MG/DL (ref 0–149)
TSH SERPL DL<=0.005 MIU/L-ACNC: 1.04 UIU/ML (ref 0.45–4.5)
VLDLC SERPL CALC-MCNC: 8 MG/DL (ref 5–40)
WBC # BLD AUTO: 4.9 X10E3/UL (ref 3.4–10.8)

## 2022-03-09 NOTE — TELEPHONE ENCOUNTER
----- Message from Aravind Joaquin MD sent at 3/9/2022 12:48 PM EST -----  Please call the patient regarding her abnormal result.  Cholesterol and lipids are still elevated her good cholesterol is also increase but would recommend discussing with a PCP at some point in the future

## 2022-06-06 ENCOUNTER — APPOINTMENT (OUTPATIENT)
Dept: WOMENS IMAGING | Facility: HOSPITAL | Age: 52
End: 2022-06-06

## 2022-06-06 ENCOUNTER — PROCEDURE VISIT (OUTPATIENT)
Dept: OBSTETRICS AND GYNECOLOGY | Age: 52
End: 2022-06-06

## 2022-06-06 DIAGNOSIS — Z12.31 VISIT FOR SCREENING MAMMOGRAM: Primary | ICD-10-CM

## 2022-06-06 PROCEDURE — 77063 BREAST TOMOSYNTHESIS BI: CPT | Performed by: OBSTETRICS & GYNECOLOGY

## 2022-06-06 PROCEDURE — 77067 SCR MAMMO BI INCL CAD: CPT | Performed by: RADIOLOGY

## 2022-06-06 PROCEDURE — 77067 SCR MAMMO BI INCL CAD: CPT | Performed by: OBSTETRICS & GYNECOLOGY

## 2022-06-06 PROCEDURE — 77063 BREAST TOMOSYNTHESIS BI: CPT | Performed by: RADIOLOGY

## 2022-08-29 ENCOUNTER — OFFICE VISIT (OUTPATIENT)
Dept: FAMILY MEDICINE CLINIC | Facility: CLINIC | Age: 52
End: 2022-08-29

## 2022-08-29 VITALS
SYSTOLIC BLOOD PRESSURE: 120 MMHG | HEART RATE: 98 BPM | HEIGHT: 64 IN | TEMPERATURE: 98.2 F | BODY MASS INDEX: 23.73 KG/M2 | WEIGHT: 139 LBS | RESPIRATION RATE: 15 BRPM | DIASTOLIC BLOOD PRESSURE: 80 MMHG | OXYGEN SATURATION: 100 %

## 2022-08-29 DIAGNOSIS — G89.29 CHRONIC PAIN OF LEFT LOWER EXTREMITY: Primary | ICD-10-CM

## 2022-08-29 DIAGNOSIS — M79.605 CHRONIC PAIN OF LEFT LOWER EXTREMITY: Primary | ICD-10-CM

## 2022-08-29 PROCEDURE — 99213 OFFICE O/P EST LOW 20 MIN: CPT | Performed by: PHYSICIAN ASSISTANT

## 2022-08-29 NOTE — PROGRESS NOTES
"Chief Complaint  Leg Pain (left)    Subjective          Jill M Schoenbachler presents to [facility]  History of Present Illness  Mary Jane  Is a 52 year old female who presents with left leg pain for the past 6 months.  States she has been having pain going down from her upper leg to her lower leg for the past 6 months.  At times the area feels hot or cold.  She will notice some numbness if she stands or sits for period of time.  Feels like her leg goes to sleep.  States she has chronic low back pain issues.  Was told in past that she has spinal stenosis.  States the pain has worsened in her leg over the past 6 months.  Did not seek medical attention until today.  Usually pain will resolve on its own but then will recur.  Bowel movements have been daily without dark black tarry stools.  Denied any chest pain, shortness of air, cough, wheezing, swelling of ankles or recent injury/trauma.  Review of Systems   Respiratory: Negative.    Cardiovascular: Negative.    Musculoskeletal:        Left leg pain/numbness   All other systems reviewed and are negative.    Objective   Vital Signs:   /80   Pulse 98   Temp 98.2 °F (36.8 °C)   Resp 15   Ht 162.6 cm (64\")   Wt 63 kg (139 lb)   SpO2 100%   BMI 23.86 kg/m²     Physical Exam  Vitals and nursing note reviewed.   Constitutional:       Appearance: Normal appearance. She is well-developed, well-groomed and normal weight.      Interventions: Face mask in place.   HENT:      Head: Normocephalic and atraumatic.   Neck:      Vascular: No carotid bruit.      Trachea: Trachea and phonation normal. No tracheal tenderness.   Cardiovascular:      Rate and Rhythm: Normal rate and regular rhythm.      Pulses: Normal pulses.      Heart sounds: Normal heart sounds, S1 normal and S2 normal. No murmur heard.  Pulmonary:      Effort: Pulmonary effort is normal.      Breath sounds: Normal breath sounds and air entry.   Abdominal:      General: Bowel " sounds are normal.      Palpations: Abdomen is soft. There is no hepatomegaly.      Tenderness: There is no abdominal tenderness. There is no right CVA tenderness, left CVA tenderness, guarding or rebound. Negative signs include Johns's sign, Rovsing's sign, McBurney's sign, psoas sign and obturator sign.   Musculoskeletal:      Cervical back: Neck supple.      Lumbar back: Normal.      Right lower leg: Normal. No edema.      Left lower leg: Normal. No edema.      Comments: Low back: Non tender to palpation.  Negative straight Leg raise, 2+ DTR's, 2+ distal pulses, 5/5 bilateral muscle strength, and good heel-toe walk with normal gait.  Bilateral lower extremities, negative Homans' sign and no palpable cord noted.  Occasional spider veins noted in the lower leg bilaterally.   Skin:     General: Skin is warm and dry.      Capillary Refill: Capillary refill takes less than 2 seconds.   Neurological:      Mental Status: She is alert and oriented to person, place, and time.      Motor: Motor function is intact.      Gait: Gait is intact.      Deep Tendon Reflexes:      Reflex Scores:       Patellar reflexes are 2+ on the right side and 2+ on the left side.  Psychiatric:         Attention and Perception: Attention and perception normal.         Mood and Affect: Mood and affect normal.         Speech: Speech normal.         Behavior: Behavior normal. Behavior is cooperative.         Thought Content: Thought content normal.         Cognition and Memory: Cognition and memory normal.         Judgment: Judgment normal.     I wore a facial mask during this patient encounter.  Patient also wearing a surgical mask.  Hand hygiene performed before and after seeing the patient.     Result Review :                 Assessment and Plan    Diagnoses and all orders for this visit:    1. Chronic pain of left lower extremity (Primary)  -     Duplex Venous Lower Extremity - Left CAR; Future    Mary Jane was seen in office today with chronic  left lower extremity pain.  We will proceed with left duplex venous Doppler at [facility].  She will be notified of test results when completed.  I have asked her to keep a diary of her symptoms.    Follow Up   Return if symptoms worsen or fail to improve.  Patient was given instructions and counseling regarding her condition or for health maintenance advice. Please see specific information pulled into the AVS if appropriate.     [facility]  [address] [place] [id number]  Dept: [phone]  Dept Fax: [phone]  Loc: [phone]  Loc Fax: [phone]

## 2022-09-07 ENCOUNTER — HOSPITAL ENCOUNTER (OUTPATIENT)
Dept: CARDIOLOGY | Facility: HOSPITAL | Age: 52
Discharge: HOME | End: 2022-09-07
Admitting: PHYSICIAN ASSISTANT

## 2022-09-07 DIAGNOSIS — M79.605 CHRONIC PAIN OF LEFT LOWER EXTREMITY: ICD-10-CM

## 2022-09-07 DIAGNOSIS — G89.29 CHRONIC PAIN OF LEFT LOWER EXTREMITY: ICD-10-CM

## 2022-09-07 LAB
BH CV LOWER VASCULAR LEFT COMMON FEMORAL AUGMENT: NORMAL
BH CV LOWER VASCULAR LEFT COMMON FEMORAL COMPETENT: NORMAL
BH CV LOWER VASCULAR LEFT COMMON FEMORAL COMPRESS: NORMAL
BH CV LOWER VASCULAR LEFT COMMON FEMORAL PHASIC: NORMAL
BH CV LOWER VASCULAR LEFT COMMON FEMORAL SPONT: NORMAL
BH CV LOWER VASCULAR LEFT DISTAL FEMORAL COMPRESS: NORMAL
BH CV LOWER VASCULAR LEFT GASTRONEMIUS COMPRESS: NORMAL
BH CV LOWER VASCULAR LEFT GREATER SAPH AK COMPRESS: NORMAL
BH CV LOWER VASCULAR LEFT GREATER SAPH BK COMPRESS: NORMAL
BH CV LOWER VASCULAR LEFT LESSER SAPH COMPRESS: NORMAL
BH CV LOWER VASCULAR LEFT MID FEMORAL AUGMENT: NORMAL
BH CV LOWER VASCULAR LEFT MID FEMORAL COMPETENT: NORMAL
BH CV LOWER VASCULAR LEFT MID FEMORAL COMPRESS: NORMAL
BH CV LOWER VASCULAR LEFT MID FEMORAL PHASIC: NORMAL
BH CV LOWER VASCULAR LEFT MID FEMORAL SPONT: NORMAL
BH CV LOWER VASCULAR LEFT PERONEAL COMPRESS: NORMAL
BH CV LOWER VASCULAR LEFT POPLITEAL AUGMENT: NORMAL
BH CV LOWER VASCULAR LEFT POPLITEAL COMPETENT: NORMAL
BH CV LOWER VASCULAR LEFT POPLITEAL COMPRESS: NORMAL
BH CV LOWER VASCULAR LEFT POPLITEAL PHASIC: NORMAL
BH CV LOWER VASCULAR LEFT POPLITEAL SPONT: NORMAL
BH CV LOWER VASCULAR LEFT POSTERIOR TIBIAL COMPRESS: NORMAL
BH CV LOWER VASCULAR LEFT PROFUNDA FEMORAL COMPRESS: NORMAL
BH CV LOWER VASCULAR LEFT PROXIMAL FEMORAL COMPRESS: NORMAL
BH CV LOWER VASCULAR LEFT SAPHENOFEMORAL JUNCTION COMPRESS: NORMAL
BH CV LOWER VASCULAR RIGHT COMMON FEMORAL AUGMENT: NORMAL
BH CV LOWER VASCULAR RIGHT COMMON FEMORAL COMPETENT: NORMAL
BH CV LOWER VASCULAR RIGHT COMMON FEMORAL COMPRESS: NORMAL
BH CV LOWER VASCULAR RIGHT COMMON FEMORAL PHASIC: NORMAL
BH CV LOWER VASCULAR RIGHT COMMON FEMORAL SPONT: NORMAL
BH CV POP FLUID COLLECT LEFT: 1
MAXIMAL PREDICTED HEART RATE: 167 BPM
STRESS TARGET HR: 142 BPM

## 2022-09-07 PROCEDURE — 93971 EXTREMITY STUDY: CPT

## 2022-12-22 ENCOUNTER — TELEPHONE (OUTPATIENT)
Dept: FAMILY MEDICINE CLINIC | Facility: CLINIC | Age: 52
End: 2022-12-22

## 2022-12-22 RX ORDER — PROMETHAZINE HYDROCHLORIDE 25 MG/1
25 TABLET ORAL EVERY 6 HOURS PRN
Qty: 20 TABLET | Refills: 0 | Status: SHIPPED | OUTPATIENT
Start: 2022-12-22 | End: 2022-12-22 | Stop reason: SDUPTHER

## 2022-12-22 RX ORDER — PROMETHAZINE HYDROCHLORIDE 25 MG/1
25 TABLET ORAL EVERY 6 HOURS PRN
Qty: 20 TABLET | Refills: 0 | Status: SHIPPED | OUTPATIENT
Start: 2022-12-22

## 2022-12-22 NOTE — TELEPHONE ENCOUNTER
Caller: JasperkelleynbTerrence wharton    Relationship: Emergency Contact    Best call back number: [phone] (Mobile)    Requested Prescriptions:   Requested Prescriptions     Pending Prescriptions Disp Refills   • promethazine (PHENERGAN) 25 MG tablet 20 tablet 0     Sig: Take 1 tablet by mouth Every 6 (Six) Hours As Needed for Nausea or Vomiting.        Pharmacy where request should be sent: [id number] - [facility] - [address] [facility]  AT [place] [address] & [address]  [phone] [facility] [phone] FX     Additional details provided by patient: ORIGINAL PHARMACY WAS CLOSED AND PATIENT'S  NEEDS THIS MEDICATION SENT TO [facility] ASAP, PLEASE ADVISE PATIENT'S  WHEN SENT TO NEW PHARMACY ASAP    Does the patient have less than a 3 day supply:  [x] Yes  [] No    Would you like a call back once the refill request has been completed: [x] Yes [] No    If the office needs to give you a call back, can they leave a voicemail: [x] Yes [] No    Gabino Monroy Rep   12/22/22 13:15 EST

## 2022-12-22 NOTE — TELEPHONE ENCOUNTER
Caller: Schoenbachler,Joseph    Relationship: Emergency Contact    Best call back number:    [phone]        What medication are you requesting: PHENAGREN    What are your current symptoms: VOMITING AND DIARRHEA    How long have you been experiencing symptoms: STARTED LAST NIGHT    Have you had these symptoms before:    [x] Yes  [] No    Have you been treated for these symptoms before:   [x] Yes  [] No    If a prescription is needed, what is your preferred pharmacy and phone number: [facility] [id number] [facility] [address] [facility]  AT [address] [phone] [facility] [phone] FX     Additional notes:    PATIENTS  IS CALLING IN FOR PATIENT SHE HAS BEEN THROWING UP AND HAD DIARRHEA ALL NIGHT LONG AND IS ASKING TO GET PHENAGREN SENT TO PHARMACY. CAN YOU CALL BACK AND LET THEM KNOW IF HE IS SENDING IN.

## 2023-01-11 ENCOUNTER — OFFICE VISIT (OUTPATIENT)
Dept: FAMILY MEDICINE CLINIC | Facility: CLINIC | Age: 53
End: 2023-01-11
Payer: COMMERCIAL

## 2023-01-11 VITALS — OXYGEN SATURATION: 98 % | HEIGHT: 64 IN | BODY MASS INDEX: 23.86 KG/M2 | HEART RATE: 90 BPM | TEMPERATURE: 98.2 F

## 2023-01-11 DIAGNOSIS — R10.13 EPIGASTRIC CRAMPING: Primary | ICD-10-CM

## 2023-01-11 PROCEDURE — 99213 OFFICE O/P EST LOW 20 MIN: CPT | Performed by: FAMILY MEDICINE

## 2023-01-11 RX ORDER — OMEPRAZOLE 20 MG/1
20 CAPSULE, DELAYED RELEASE ORAL DAILY
COMMUNITY

## 2023-01-11 NOTE — PROGRESS NOTES
"  Subjective   Jill M Schoenbachler is a 52 y.o. female who is here for   Chief Complaint   Patient presents with   • Abdominal Cramping     Soreness above navel when cold started last week- went to  last night given pepcid and omperazole , neg covid/flu test    .     History of Present Illness     Patient with mid abdominal discomfort for 3 to 4 days.  Cramping in nature.  Feels like a contraction.  Never had abdominal issues before.  No injury no rash.  No nausea throwing up.  No change in bowels or bladder.  Went to urgent care last night will swab for URI infections all negative.      The following portions of the patient's history were reviewed and updated as appropriate: allergies, current medications, past family history, past medical history, past social history, past surgical history and problem list.    Review of Systems    Objective   Vitals:    01/11/23 1641   Pulse: 90   Temp: 98.2 °F (36.8 °C)   SpO2: 98%   Height: 162.6 cm (64\")      Physical Exam  Vitals reviewed.   Cardiovascular:      Rate and Rhythm: Normal rate.   Pulmonary:      Effort: Pulmonary effort is normal.   Abdominal:      General: Abdomen is flat. Bowel sounds are normal.      Tenderness: There is abdominal tenderness in the epigastric area. There is no right CVA tenderness, left CVA tenderness or guarding. Negative signs include Johns's sign.       Neurological:      Mental Status: She is alert.         Assessment & Plan   Diagnoses and all orders for this visit:    1. Epigastric cramping (Primary)  -     CBC & Differential; Future  -     Comprehensive Metabolic Panel; Future  -     Amylase; Future  -     Lipase; Future  -     Helicobacter Pylori, IgA IgG IgM; Future    She will come back tomorrow for labs.  As it is currently after hours and lab is closed  We discussed red flags for abdominal pain including vomiting and diarrhea and fever, which she currently does not have.  Okay to take the Prilosec given from urgent care.  This " will treat possible gastritis    There are no Patient Instructions on file for this visit.    There are no discontinued medications.     No follow-ups on file.    Dr. Lino Brian  [place], [place]

## 2023-05-10 ENCOUNTER — OFFICE VISIT (OUTPATIENT)
Dept: FAMILY MEDICINE CLINIC | Facility: CLINIC | Age: 53
End: 2023-05-10
Payer: COMMERCIAL

## 2023-05-10 VITALS
HEIGHT: 64 IN | OXYGEN SATURATION: 99 % | TEMPERATURE: 98.2 F | BODY MASS INDEX: 22.88 KG/M2 | SYSTOLIC BLOOD PRESSURE: 110 MMHG | DIASTOLIC BLOOD PRESSURE: 60 MMHG | HEART RATE: 94 BPM | WEIGHT: 134 LBS

## 2023-05-10 DIAGNOSIS — J01.00 ACUTE NON-RECURRENT MAXILLARY SINUSITIS: Primary | ICD-10-CM

## 2023-05-10 PROCEDURE — 99213 OFFICE O/P EST LOW 20 MIN: CPT | Performed by: FAMILY MEDICINE

## 2023-05-10 RX ORDER — AMOXICILLIN AND CLAVULANATE POTASSIUM 875; 125 MG/1; MG/1
1 TABLET, FILM COATED ORAL 2 TIMES DAILY
Qty: 20 TABLET | Refills: 0 | Status: SHIPPED | OUTPATIENT
Start: 2023-05-10

## 2023-05-10 NOTE — PROGRESS NOTES
Subjective   Jill M Schoenbachler is a 52 y.o. female with   Chief Complaint   Patient presents with   • Cough   • Fatigue   • Sinusitis   .    History of Present Illness   52-year-old white female with complaint of sinus pressure with increased nasal congestion and postnasal drip with onset of approximately 2-1/2 weeks ago.  There has been a nonproductive cough that seems to be random throughout both day and night.  She has been using over-the-counter Tylenol Sinus and other cold and cough remedies such as Robitussin.  Symptoms fail to get better.  Her initial symptom was sore throat yet there has been no fever, nausea/vomiting and no loss of taste or smell.  She denies chest pain and there has been no shortness of air or audible wheezing.  The following portions of the patient's history were reviewed and updated as appropriate: allergies, current medications, past family history, past medical history, past social history, past surgical history and problem list.    Review of Systems   Constitutional: Negative for fatigue and fever.   HENT: Positive for congestion, postnasal drip and sore throat.    Respiratory: Positive for cough. Negative for shortness of breath and wheezing.    Cardiovascular: Negative for chest pain.   Gastrointestinal: Negative for nausea and vomiting.   Musculoskeletal: Negative for myalgias.       Objective     Vitals:    05/10/23 1620   BP: 110/60   Pulse: 94   Temp: 98.2 °F (36.8 °C)   SpO2: 99%       No results found for this or any previous visit (from the past 672 hour(s)).    Physical Exam  Vitals and nursing note reviewed.   Constitutional:       Appearance: Normal appearance. She is well-developed and well-groomed.   HENT:      Head: Normocephalic and atraumatic.      Right Ear: Hearing, tympanic membrane, ear canal and external ear normal.      Left Ear: Hearing, tympanic membrane, ear canal and external ear normal.      Nose: Nose normal.      Mouth/Throat:      Lips: Pink.       Mouth: Mucous membranes are moist.      Pharynx: Uvula midline. Posterior oropharyngeal erythema present. No oropharyngeal exudate.   Neck:      Thyroid: No thyroid mass or thyromegaly.      Vascular: Normal carotid pulses. No carotid bruit.      Trachea: Trachea and phonation normal.   Cardiovascular:      Rate and Rhythm: Normal rate and regular rhythm.      Heart sounds: Normal heart sounds. No murmur heard.    No friction rub. No gallop.   Pulmonary:      Effort: Pulmonary effort is normal. No respiratory distress.      Breath sounds: Normal breath sounds. No decreased breath sounds, wheezing, rhonchi or rales.   Musculoskeletal:      Cervical back: Neck supple.   Lymphadenopathy:      Cervical: No cervical adenopathy.   Skin:     General: Skin is warm and dry.      Findings: No rash.   Neurological:      Mental Status: She is alert and oriented to person, place, and time.   Psychiatric:         Attention and Perception: Attention and perception normal.         Mood and Affect: Mood and affect normal.         Speech: Speech normal.         Behavior: Behavior normal. Behavior is cooperative.         Thought Content: Thought content normal.         Cognition and Memory: Cognition and memory normal.         Judgment: Judgment normal.         Assessment & Plan   Diagnoses and all orders for this visit:    1. Acute non-recurrent maxillary sinusitis (Primary)  -     amoxicillin-clavulanate (Augmentin) 875-125 MG per tablet; Take 1 tablet by mouth 2 (Two) Times a Day.  Dispense: 20 tablet; Refill: 0        Return if symptoms worsen or fail to improve.

## 2023-11-06 DIAGNOSIS — Z12.31 VISIT FOR SCREENING MAMMOGRAM: Primary | ICD-10-CM

## 2024-08-06 ENCOUNTER — OFFICE VISIT (OUTPATIENT)
Dept: OBSTETRICS AND GYNECOLOGY | Age: 54
End: 2024-08-06
Payer: COMMERCIAL

## 2024-08-06 ENCOUNTER — HOSPITAL ENCOUNTER (OUTPATIENT)
Facility: HOSPITAL | Age: 54
Discharge: HOME | End: 2024-08-06
Admitting: OBSTETRICS & GYNECOLOGY
Payer: COMMERCIAL

## 2024-08-06 VITALS
DIASTOLIC BLOOD PRESSURE: 70 MMHG | SYSTOLIC BLOOD PRESSURE: 112 MMHG | WEIGHT: 142 LBS | HEIGHT: 64 IN | BODY MASS INDEX: 24.24 KG/M2

## 2024-08-06 DIAGNOSIS — E55.9 VITAMIN D DEFICIENCY: Primary | ICD-10-CM

## 2024-08-06 DIAGNOSIS — Z12.4 SCREENING FOR CERVICAL CANCER: ICD-10-CM

## 2024-08-06 DIAGNOSIS — Z01.419 ENCOUNTER FOR GYNECOLOGICAL EXAMINATION: ICD-10-CM

## 2024-08-06 DIAGNOSIS — Z80.49 FAMILY HISTORY OF CERVICAL CANCER: ICD-10-CM

## 2024-08-06 DIAGNOSIS — Z12.31 VISIT FOR SCREENING MAMMOGRAM: ICD-10-CM

## 2024-08-06 DIAGNOSIS — Z12.31 BREAST CANCER SCREENING BY MAMMOGRAM: ICD-10-CM

## 2024-08-06 PROBLEM — R73.02 IMPAIRED GLUCOSE TOLERANCE: Status: RESOLVED | Noted: 2019-04-03 | Resolved: 2024-08-06

## 2024-08-06 PROBLEM — Z22.330 GBS CARRIER: Status: RESOLVED | Noted: 2021-01-26 | Resolved: 2024-08-06

## 2024-08-06 PROCEDURE — 77067 SCR MAMMO BI INCL CAD: CPT

## 2024-08-06 PROCEDURE — 77063 BREAST TOMOSYNTHESIS BI: CPT

## 2024-08-06 PROCEDURE — 99396 PREV VISIT EST AGE 40-64: CPT | Performed by: OBSTETRICS & GYNECOLOGY

## 2024-08-06 RX ORDER — CHOLECALCIFEROL (VITAMIN D3) 25 MCG
1000 TABLET ORAL DAILY
COMMUNITY

## 2024-08-06 RX ORDER — MULTIPLE VITAMINS W/ MINERALS TAB 9MG-400MCG
1 TAB ORAL DAILY
COMMUNITY
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
## 2024-08-07 ENCOUNTER — TELEPHONE (OUTPATIENT)
Dept: OBSTETRICS AND GYNECOLOGY | Age: 54
End: 2024-08-07
Payer: COMMERCIAL

## 2024-08-07 DIAGNOSIS — Z12.11 ENCOUNTER FOR SCREENING COLONOSCOPY FOR NON-HIGH-RISK PATIENT: Primary | ICD-10-CM

## 2024-08-07 DIAGNOSIS — Z12.31 VISIT FOR SCREENING MAMMOGRAM: Primary | ICD-10-CM

## 2024-08-07 LAB
25(OH)D3+25(OH)D2 SERPL-MCNC: 62.1 NG/ML (ref 30–100)
ALBUMIN SERPL-MCNC: 4.9 G/DL (ref 3.5–5.2)
ALBUMIN/GLOB SERPL: 2.2 G/DL
ALP SERPL-CCNC: 97 U/L (ref 39–117)
ALT SERPL-CCNC: 21 U/L (ref 1–33)
AST SERPL-CCNC: 23 U/L (ref 1–32)
BILIRUB SERPL-MCNC: 0.4 MG/DL (ref 0–1.2)
BUN SERPL-MCNC: 15 MG/DL (ref 6–20)
BUN/CREAT SERPL: 23.4 (ref 7–25)
CALCIUM SERPL-MCNC: 9.9 MG/DL (ref 8.6–10.5)
CHLORIDE SERPL-SCNC: 105 MMOL/L (ref 98–107)
CHOLEST SERPL-MCNC: 265 MG/DL (ref 0–200)
CO2 SERPL-SCNC: 27.9 MMOL/L (ref 22–29)
CREAT SERPL-MCNC: 0.64 MG/DL (ref 0.57–1)
EGFRCR SERPLBLD CKD-EPI 2021: 104.5 ML/MIN/1.73
ERYTHROCYTE [DISTWIDTH] IN BLOOD BY AUTOMATED COUNT: 13.1 % (ref 12.3–15.4)
GLOBULIN SER CALC-MCNC: 2.2 GM/DL
GLUCOSE SERPL-MCNC: 90 MG/DL (ref 65–99)
HCT VFR BLD AUTO: 43.3 % (ref 34–46.6)
HDLC SERPL-MCNC: 85 MG/DL (ref 40–60)
HGB BLD-MCNC: 14.1 G/DL (ref 12–15.9)
LDLC SERPL CALC-MCNC: 171 MG/DL (ref 0–100)
MCH RBC QN AUTO: 30.1 PG (ref 26.6–33)
MCHC RBC AUTO-ENTMCNC: 32.6 G/DL (ref 31.5–35.7)
MCV RBC AUTO: 92.5 FL (ref 79–97)
PLATELET # BLD AUTO: 279 10*3/MM3 (ref 140–450)
POTASSIUM SERPL-SCNC: 4.9 MMOL/L (ref 3.5–5.2)
PROT SERPL-MCNC: 7.1 G/DL (ref 6–8.5)
RBC # BLD AUTO: 4.68 10*6/MM3 (ref 3.77–5.28)
SODIUM SERPL-SCNC: 145 MMOL/L (ref 136–145)
TRIGL SERPL-MCNC: 58 MG/DL (ref 0–150)
TSH SERPL DL<=0.005 MIU/L-ACNC: 1.1 UIU/ML (ref 0.45–4.5)
VLDLC SERPL CALC-MCNC: 9 MG/DL (ref 5–40)
WBC # BLD AUTO: 5.14 10*3/MM3 (ref 3.4–10.8)

## 2024-08-07 NOTE — PROGRESS NOTES
Please let Mary Jane know that overall lab work looks good except her cholesterol and lipids were abnormal.  Would recommend she follow-up with her PCP

## 2024-08-07 NOTE — TELEPHONE ENCOUNTER
Pt had annual yesterday 8/6/24  Pt forgot to mention to Dr. Joaquin about who he would recommend to go get a colonoscopy and wanted me to send a message to ask. Please advise

## 2024-08-10 LAB
CYTOLOGIST CVX/VAG CYTO: NORMAL
CYTOLOGY CVX/VAG DOC CYTO: NORMAL
CYTOLOGY CVX/VAG DOC THIN PREP: NORMAL
DX ICD CODE: NORMAL
HPV I/H RISK 4 DNA CVX QL PROBE+SIG AMP: NEGATIVE
Lab: NORMAL
OTHER STN SPEC: NORMAL
STAT OF ADQ CVX/VAG CYTO-IMP: NORMAL

## 2024-08-13 ENCOUNTER — TELEPHONE (OUTPATIENT)
Dept: OBSTETRICS AND GYNECOLOGY | Age: 54
End: 2024-08-13
Payer: COMMERCIAL

## 2024-08-13 NOTE — TELEPHONE ENCOUNTER
Addis Armando did that, usually they will give them the suggested interval anywhere from 3 to 10 years.  If there were no polyps and no first-degree relatives they usually say 5-10.  But I would suggest that she will call Dr. Armando's office for definitive clarification

## 2024-12-10 ENCOUNTER — OFFICE VISIT (OUTPATIENT)
Dept: FAMILY MEDICINE CLINIC | Facility: CLINIC | Age: 54
End: 2024-12-10
Payer: COMMERCIAL

## 2024-12-10 VITALS
TEMPERATURE: 98 F | DIASTOLIC BLOOD PRESSURE: 80 MMHG | OXYGEN SATURATION: 99 % | SYSTOLIC BLOOD PRESSURE: 120 MMHG | WEIGHT: 144 LBS | HEART RATE: 78 BPM | BODY MASS INDEX: 24.59 KG/M2 | HEIGHT: 64 IN

## 2024-12-10 DIAGNOSIS — F41.9 ANXIETY: ICD-10-CM

## 2024-12-10 DIAGNOSIS — J30.9 ALLERGIC RHINITIS, UNSPECIFIED SEASONALITY, UNSPECIFIED TRIGGER: Primary | ICD-10-CM

## 2024-12-10 PROCEDURE — 99213 OFFICE O/P EST LOW 20 MIN: CPT | Performed by: NURSE PRACTITIONER

## 2024-12-10 RX ORDER — HYDROXYZINE HYDROCHLORIDE 10 MG/1
10 TABLET, FILM COATED ORAL 3 TIMES DAILY PRN
Qty: 30 TABLET | Refills: 0 | Status: SHIPPED | OUTPATIENT
Start: 2024-12-10

## 2024-12-10 RX ORDER — FLUTICASONE PROPIONATE 50 MCG
2 SPRAY, SUSPENSION (ML) NASAL DAILY
Qty: 16 G | Refills: 0 | Status: SHIPPED | OUTPATIENT
Start: 2024-12-10

## 2024-12-10 NOTE — PROGRESS NOTES
Patient ID: Jill Marie Schoenbachler is a 54 y.o. female     Patient Care Team:  Sukhdev Coronado DO as PCP - General (Family Medicine)    Subjective     Chief Complaint   Patient presents with    throat tightening      Not choking or difficulty swallowing, feels like its closing up as if its allergic reaction but not itching. Has been off and on, x several months.     Anxiety       History of Present Illness    Jill Marie Schoenbachler presents to [facility] today for throat tightness.     History of Present Illness  The patient presents for evaluation of throat tightness.    She reports experiencing a sensation of throat tightness for the past 6 months, without any associated difficulty in swallowing. The onset of this symptom coincided with her sister's cancer diagnosis, which has been a source of anxiety for her. She describes herself as a generally anxious individual, particularly when it comes to family matters. She has not sought treatment for anxiety and does not perceive any new stressors in her life. She is not currently employed but previously worked at the [employer], a job she enjoyed. She has not initiated any new medications recently. She finds the throat tightness bothersome but manages it by reminding herself that it will eventually subside. She has not sought treatment for sinus or allergy symptoms. She reports no ear popping sensation but notes increased nasal congestion in the mornings. She reports no shortness of breath, heart palpitations, or chest pain. She also reports no need for frequent throat clearing. She has not undergone any allergy testing and is uncertain if the symptom could be related to allergies or anxiety. She has not taken any medication for this symptom and prefers not to take medication for her hot flashes. The symptom is more pronounced at night, particularly when she lies down to sleep.    FAMILY HISTORY  Her sister had cancer and  underwent a full hysterectomy. Her son became epileptic at age 17 and is now 23 which causes anxiety.    Results         She denies any complaints of fever, chills, cough, chest pain, shortness of air, abdominal pain, nausea, or any other concerns.     The following portions of the patient's history were reviewed and updated as appropriate: allergies, current medications, past family history, past medical history, past social history, past surgical history and problem list.       ROS    Vitals:    12/10/24 1124   BP: 120/80   Pulse: 78   Temp: 98 °F (36.7 °C)   SpO2: 99%       Documented weights    12/10/24 1124   Weight: 65.3 kg (144 lb)     Body mass index is 24.72 kg/m².    Results for orders placed or performed in visit on 08/06/24   IGP, Apt HPV,rfx 16 / 18,45    Collection Time: 08/06/24 12:00 AM    Specimen: Cervix; ThinPrep Vial    ThinPrep  Release to p   Result Value Ref Range    Diagnosis Comment     Specimen adequacy: Comment     Clinician Provided ICD-10: Comment     Performed by: Comment     . .     Note: Comment     Method: Comment     HPV Aptima Negative Negative   CBC (No Diff)    Collection Time: 08/06/24  1:53 PM    Specimen: Blood   Result Value Ref Range    WBC 5.14 3.40 - 10.80 10*3/mm3    RBC 4.68 3.77 - 5.28 10*6/mm3    Hemoglobin 14.1 12.0 - 15.9 g/dL    Hematocrit 43.3 34.0 - 46.6 %    MCV 92.5 79.0 - 97.0 fL    MCH 30.1 26.6 - 33.0 pg    MCHC 32.6 31.5 - 35.7 g/dL    RDW 13.1 12.3 - 15.4 %    Platelets 279 140 - 450 10*3/mm3   Comprehensive Metabolic Panel    Collection Time: 08/06/24  1:53 PM    Specimen: Blood   Result Value Ref Range    Glucose 90 65 - 99 mg/dL    BUN 15 6 - 20 mg/dL    Creatinine 0.64 0.57 - 1.00 mg/dL    EGFR Result 104.5 >60.0 mL/min/1.73    BUN/Creatinine Ratio 23.4 7.0 - 25.0    Sodium 145 136 - 145 mmol/L    Potassium 4.9 3.5 - 5.2 mmol/L    Chloride 105 98 - 107 mmol/L    Total CO2 27.9 22.0 - 29.0 mmol/L    Calcium 9.9 8.6 - 10.5 mg/dL    Total Protein 7.1 6.0 -  8.5 g/dL    Albumin 4.9 3.5 - 5.2 g/dL    Globulin 2.2 gm/dL    A/G Ratio 2.2 g/dL    Total Bilirubin 0.4 0.0 - 1.2 mg/dL    Alkaline Phosphatase 97 39 - 117 U/L    AST (SGOT) 23 1 - 32 U/L    ALT (SGPT) 21 1 - 33 U/L   Thyroid [place] Profile    Collection Time: 08/06/24  1:53 PM    Specimen: Blood   Result Value Ref Range    TSH 1.100 0.450 - 4.500 uIU/mL   Lipid Panel    Collection Time: 08/06/24  1:53 PM    Specimen: Blood   Result Value Ref Range    Total Cholesterol 265 (H) 0 - 200 mg/dL    Triglycerides 58 0 - 150 mg/dL    HDL Cholesterol 85 (H) 40 - 60 mg/dL    VLDL Cholesterol Sigifredo 9 5 - 40 mg/dL    LDL Chol Calc (NIH) 171 (H) 0 - 100 mg/dL   Vitamin D,25-Hydroxy    Collection Time: 08/06/24  1:53 PM    Specimen: Blood   Result Value Ref Range    25 Hydroxy, Vitamin D 62.1 30.0 - 100.0 ng/ml           Objective     Physical Exam  Vitals reviewed.   Constitutional:       General: She is not in acute distress.  HENT:      Head: Normocephalic and atraumatic.      Right Ear: Tympanic membrane is bulging. Tympanic membrane is not erythematous.      Left Ear: Tympanic membrane is not erythematous or bulging.      Nose: No congestion.      Mouth/Throat:      Pharynx: No posterior oropharyngeal erythema.      Comments: PND noted  Neck:      Vascular: No carotid bruit.   Cardiovascular:      Rate and Rhythm: Normal rate and regular rhythm.      Heart sounds: No murmur heard.  Pulmonary:      Effort: Pulmonary effort is normal.      Breath sounds: Normal breath sounds. No wheezing or rhonchi.   Musculoskeletal:      Cervical back: Normal range of motion.   Lymphadenopathy:      Cervical: No cervical adenopathy.   Neurological:      Mental Status: She is alert and oriented to person, place, and time.   Psychiatric:         Mood and Affect: Mood is anxious.         Physical Exam  Ears appear normal but slightly bulging. There is a little drainage in the back of the throat, but it is not red.  Carotid arteries in the  neck are normal.  Lungs sound good.  Heart sounds are good.    BMI is within normal parameters. No other follow-up for BMI required.      Assessment & Plan     Assessment/Plan     Diagnoses and all orders for this visit:    1. Allergic rhinitis, unspecified seasonality, unspecified trigger (Primary)  -     fluticasone (FLONASE) 50 MCG/ACT nasal spray; Administer 2 sprays into the nostril(s) as directed by provider Daily.  Dispense: 16 g; Refill: 0  -     hydrOXYzine (ATARAX) 10 MG tablet; Take 1 tablet by mouth 3 (Three) Times a Day As Needed for Allergies or Anxiety.  Dispense: 30 tablet; Refill: 0    2. Anxiety  -     hydrOXYzine (ATARAX) 10 MG tablet; Take 1 tablet by mouth 3 (Three) Times a Day As Needed for Allergies or Anxiety.  Dispense: 30 tablet; Refill: 0        Assessment & Plan  1. Throat tightness.  The symptoms suggest a potential link to anxiety, given the absence of dysphagia. Sinus drainage could also be a contributing factor. A prescription for hydroxyzine 10 mg has been provided, to be taken as needed for anxiety or allergies. She has been advised to take Claritin daily and use Flonase nasal spray to manage sinus drainage. The potential side effects of hydroxyzine, including fatigue and drowsiness, have been discussed. If the symptoms persist or worsen, she should follow up with Dr. Coronado.      Follow Up:  Return if symptoms worsen or fail to improve.    In the meantime, instructed to contact us sooner for any problems or concerns.    Patient was given instructions and counseling regarding condition or for health maintenance advice.  Please see specific information pulled into the AVS if appropriate.      Patient or patient representative verbalized consent for the use of Ambient Listening during the visit with  BEKA Pacheco for chart documentation. 12/10/2024  12:02 BEKA Spicer  Family Medicine  [facility]  12/10/24  12:02 EST

## 2025-06-02 ENCOUNTER — OFFICE VISIT (OUTPATIENT)
Dept: FAMILY MEDICINE CLINIC | Facility: CLINIC | Age: 55
End: 2025-06-02
Payer: COMMERCIAL

## 2025-06-02 VITALS
HEART RATE: 79 BPM | SYSTOLIC BLOOD PRESSURE: 118 MMHG | BODY MASS INDEX: 23.99 KG/M2 | DIASTOLIC BLOOD PRESSURE: 70 MMHG | WEIGHT: 140.5 LBS | TEMPERATURE: 97.8 F | OXYGEN SATURATION: 99 % | HEIGHT: 64 IN

## 2025-06-02 DIAGNOSIS — R13.10 DYSPHAGIA, UNSPECIFIED TYPE: ICD-10-CM

## 2025-06-02 DIAGNOSIS — R06.02 SHORTNESS OF BREATH: ICD-10-CM

## 2025-06-02 DIAGNOSIS — J38.5 LARYNGEAL SPASM: Primary | ICD-10-CM

## 2025-06-02 DIAGNOSIS — J37.0 CONGESTION OF LARYNX: ICD-10-CM

## 2025-06-02 PROCEDURE — 99213 OFFICE O/P EST LOW 20 MIN: CPT | Performed by: FAMILY MEDICINE

## 2025-06-03 NOTE — PROGRESS NOTES
Subjective   Jill Marie Schoenbachler is a 54 y.o. female with   Chief Complaint   Patient presents with    Ongoing throat issue     X 1.5 years, worse when laying down, feels like her throat is full of mucus   .    History of Present Illness   54-year-old white female who over the last year and a half has had recurrent episodes where she finds it hard to breathe with tightening in her throat associated with increased mucus.  She has no idea what the underlying issue is but when it occurs it makes her extremely anxious.  It seems to come and go spontaneously and can be gone for sometimes weeks without recurrence.  She feels that during these episodes that is also hard to swallow but there has been no nausea/vomiting, association with eating or types of food.  Patient denies syncope or near syncope.  The following portions of the patient's history were reviewed and updated as appropriate: allergies, current medications, past family history, past medical history, past social history, past surgical history and problem list.    Review of Systems   HENT:  Positive for congestion.         Throat tightening with difficulty breathing       Objective     Vitals:    06/02/25 1459   BP: 118/70   Pulse: 79   Temp: 97.8 °F (36.6 °C)   SpO2: 99%       No results found for this or any previous visit (from the past 4 weeks).    Physical Exam  Vitals and nursing note reviewed.   Constitutional:       Appearance: Normal appearance. She is well-developed and well-groomed.   HENT:      Head: Normocephalic and atraumatic.      Right Ear: Hearing, tympanic membrane, ear canal and external ear normal.      Left Ear: Hearing, tympanic membrane, ear canal and external ear normal.      Nose: Nose normal.      Mouth/Throat:      Lips: Pink.      Mouth: Mucous membranes are moist.      Pharynx: Oropharynx is clear. Uvula midline.   Neck:      Thyroid: No thyroid mass or thyromegaly.      Vascular: Normal carotid pulses. No carotid bruit.       Trachea: Trachea and phonation normal.   Cardiovascular:      Rate and Rhythm: Normal rate and regular rhythm.      Heart sounds: Normal heart sounds. No murmur heard.     No friction rub. No gallop.   Pulmonary:      Effort: Pulmonary effort is normal. No respiratory distress.      Breath sounds: Normal breath sounds. No decreased breath sounds, wheezing, rhonchi or rales.   Musculoskeletal:      Cervical back: Neck supple.   Lymphadenopathy:      Cervical: No cervical adenopathy.   Skin:     General: Skin is warm and dry.      Findings: No rash.   Neurological:      Mental Status: She is alert and oriented to person, place, and time.   Psychiatric:         Attention and Perception: Attention and perception normal.         Mood and Affect: Mood and affect normal.         Speech: Speech normal.         Behavior: Behavior normal. Behavior is cooperative.         Thought Content: Thought content normal.         Cognition and Memory: Cognition and memory normal.         Judgment: Judgment normal.         Assessment & Plan   Diagnoses and all orders for this visit:    1. Laryngeal spasm (Primary)  -     Ambulatory Referral to ENT (Otolaryngology)    2. Congestion of larynx  -     Ambulatory Referral to ENT (Otolaryngology)    3. Shortness of breath  -     Ambulatory Referral to ENT (Otolaryngology)    4. Dysphagia, unspecified type  -     Ambulatory Referral to ENT (Otolaryngology)        No follow-ups on file.  BMI is within normal parameters. No other follow-up for BMI required.

## 2025-08-11 ENCOUNTER — HOSPITAL ENCOUNTER (OUTPATIENT)
Facility: HOSPITAL | Age: 55
Discharge: HOME | End: 2025-08-11
Admitting: OBSTETRICS & GYNECOLOGY
Payer: COMMERCIAL

## 2025-08-11 ENCOUNTER — OFFICE VISIT (OUTPATIENT)
Dept: OBSTETRICS AND GYNECOLOGY | Age: 55
End: 2025-08-11
Payer: COMMERCIAL

## 2025-08-11 VITALS
HEIGHT: 64 IN | BODY MASS INDEX: 24.41 KG/M2 | SYSTOLIC BLOOD PRESSURE: 120 MMHG | WEIGHT: 143 LBS | DIASTOLIC BLOOD PRESSURE: 74 MMHG

## 2025-08-11 DIAGNOSIS — Z80.49 FAMILY HISTORY OF CERVICAL CANCER: ICD-10-CM

## 2025-08-11 DIAGNOSIS — Z12.31 BREAST CANCER SCREENING BY MAMMOGRAM: ICD-10-CM

## 2025-08-11 DIAGNOSIS — Z12.31 VISIT FOR SCREENING MAMMOGRAM: ICD-10-CM

## 2025-08-11 DIAGNOSIS — Z12.4 SCREENING FOR CERVICAL CANCER: ICD-10-CM

## 2025-08-11 DIAGNOSIS — E55.9 VITAMIN D DEFICIENCY: ICD-10-CM

## 2025-08-11 DIAGNOSIS — Z01.419 ENCOUNTER FOR GYNECOLOGICAL EXAMINATION: Primary | ICD-10-CM

## 2025-08-11 DIAGNOSIS — N95.1 MENOPAUSAL SYMPTOMS: ICD-10-CM

## 2025-08-11 PROBLEM — R53.83 OTHER FATIGUE: Status: ACTIVE | Noted: 2025-08-11

## 2025-08-11 PROBLEM — R63.5 UNINTENDED WEIGHT GAIN: Status: ACTIVE | Noted: 2025-08-11

## 2025-08-11 PROBLEM — R53.83 LOW ENERGY: Status: ACTIVE | Noted: 2025-08-11

## 2025-08-11 PROCEDURE — 77063 BREAST TOMOSYNTHESIS BI: CPT

## 2025-08-11 PROCEDURE — 77067 SCR MAMMO BI INCL CAD: CPT

## 2025-08-12 ENCOUNTER — TELEPHONE (OUTPATIENT)
Dept: OBSTETRICS AND GYNECOLOGY | Age: 55
End: 2025-08-12
Payer: COMMERCIAL

## 2025-08-12 LAB
25(OH)D3+25(OH)D2 SERPL-MCNC: 40 NG/ML (ref 30–100)
ALBUMIN SERPL-MCNC: 4.6 G/DL (ref 3.5–5.2)
ALBUMIN/GLOB SERPL: 1.8 G/DL
ALP SERPL-CCNC: 92 U/L (ref 39–117)
ALT SERPL-CCNC: 17 U/L (ref 1–33)
AST SERPL-CCNC: 22 U/L (ref 1–32)
BILIRUB SERPL-MCNC: 0.4 MG/DL (ref 0–1.2)
BUN SERPL-MCNC: 13 MG/DL (ref 6–20)
BUN/CREAT SERPL: 19.1 (ref 7–25)
CALCIUM SERPL-MCNC: 9.7 MG/DL (ref 8.6–10.5)
CHLORIDE SERPL-SCNC: 104 MMOL/L (ref 98–107)
CHOLEST SERPL-MCNC: 280 MG/DL (ref 0–200)
CO2 SERPL-SCNC: 26 MMOL/L (ref 22–29)
CREAT SERPL-MCNC: 0.68 MG/DL (ref 0.57–1)
EGFRCR SERPLBLD CKD-EPI 2021: 102.4 ML/MIN/1.73
ERYTHROCYTE [DISTWIDTH] IN BLOOD BY AUTOMATED COUNT: 13 % (ref 12.3–15.4)
GLOBULIN SER CALC-MCNC: 2.5 GM/DL
GLUCOSE SERPL-MCNC: 91 MG/DL (ref 65–99)
HCT VFR BLD AUTO: 42.7 % (ref 34–46.6)
HDLC SERPL-MCNC: 77 MG/DL (ref 40–60)
HGB BLD-MCNC: 13.5 G/DL (ref 12–15.9)
LDLC SERPL CALC-MCNC: 196 MG/DL (ref 0–100)
MCH RBC QN AUTO: 29.7 PG (ref 26.6–33)
MCHC RBC AUTO-ENTMCNC: 31.6 G/DL (ref 31.5–35.7)
MCV RBC AUTO: 94.1 FL (ref 79–97)
PLATELET # BLD AUTO: 264 10*3/MM3 (ref 140–450)
POTASSIUM SERPL-SCNC: 5 MMOL/L (ref 3.5–5.2)
PROT SERPL-MCNC: 7.1 G/DL (ref 6–8.5)
RBC # BLD AUTO: 4.54 10*6/MM3 (ref 3.77–5.28)
SODIUM SERPL-SCNC: 142 MMOL/L (ref 136–145)
TRIGL SERPL-MCNC: 52 MG/DL (ref 0–150)
TSH SERPL DL<=0.005 MIU/L-ACNC: 1.25 UIU/ML (ref 0.45–4.5)
VLDLC SERPL CALC-MCNC: 7 MG/DL (ref 5–40)
WBC # BLD AUTO: 4.76 10*3/MM3 (ref 3.4–10.8)

## 2025-08-14 LAB
CYTOLOGIST CVX/VAG CYTO: NORMAL
CYTOLOGY CVX/VAG DOC CYTO: NORMAL
CYTOLOGY CVX/VAG DOC THIN PREP: NORMAL
DX ICD CODE: NORMAL
HPV I/H RISK 4 DNA CVX QL PROBE+SIG AMP: NEGATIVE
OTHER STN SPEC: NORMAL
SERVICE CMNT-IMP: NORMAL
STAT OF ADQ CVX/VAG CYTO-IMP: NORMAL

## (undated) DEVICE — LN SMPL CO2 SHTRM SD STREAM W/M LUER

## (undated) DEVICE — SENSR O2 OXIMAX FNGR A/ 18IN NONSTR

## (undated) DEVICE — TUBING, SUCTION, 1/4" X 10', STRAIGHT: Brand: MEDLINE

## (undated) DEVICE — THE TORRENT IRRIGATION SCOPE CONNECTOR IS USED WITH THE TORRENT IRRIGATION TUBING TO PROVIDE IRRIGATION FLUIDS SUCH AS STERILE WATER DURING GASTROINTESTINAL ENDOSCOPIC PROCEDURES WHEN USED IN CONJUNCTION WITH AN IRRIGATION PUMP (OR ELECTROSURGICAL UNIT).: Brand: TORRENT

## (undated) DEVICE — KT ORCA ORCAPOD DISP STRL

## (undated) DEVICE — CANN O2 ETCO2 FITS ALL CONN CO2 SMPL A/ 7IN DISP LF

## (undated) DEVICE — ADAPT CLN BIOGUARD AIR/H2O DISP